# Patient Record
Sex: FEMALE | Race: BLACK OR AFRICAN AMERICAN | NOT HISPANIC OR LATINO | Employment: FULL TIME | URBAN - METROPOLITAN AREA
[De-identification: names, ages, dates, MRNs, and addresses within clinical notes are randomized per-mention and may not be internally consistent; named-entity substitution may affect disease eponyms.]

---

## 2020-03-03 ENCOUNTER — HOSPITAL ENCOUNTER (EMERGENCY)
Facility: CLINIC | Age: 25
Discharge: HOME | End: 2020-03-04
Attending: EMERGENCY MEDICINE | Admitting: EMERGENCY MEDICINE
Payer: COMMERCIAL

## 2020-03-03 DIAGNOSIS — T50.905A ADVERSE EFFECT OF DRUG, INITIAL ENCOUNTER: ICD-10-CM

## 2020-03-03 DIAGNOSIS — R40.0 SOMNOLENCE: ICD-10-CM

## 2020-03-03 PROCEDURE — 96360 HYDRATION IV INFUSION INIT: CPT

## 2020-03-03 PROCEDURE — 25800030 ZZH RX IP 258 OP 636: Performed by: EMERGENCY MEDICINE

## 2020-03-03 PROCEDURE — 99291 CRITICAL CARE FIRST HOUR: CPT | Mod: 25

## 2020-03-03 PROCEDURE — 40000275 ZZH STATISTIC RCP TIME EA 10 MIN

## 2020-03-03 PROCEDURE — 40000965 ZZH STATISTIC END TITIAL CO2 MONITORING

## 2020-03-03 PROCEDURE — 80053 COMPREHEN METABOLIC PANEL: CPT | Performed by: EMERGENCY MEDICINE

## 2020-03-03 PROCEDURE — 85025 COMPLETE CBC W/AUTO DIFF WBC: CPT | Performed by: EMERGENCY MEDICINE

## 2020-03-03 PROCEDURE — 93005 ELECTROCARDIOGRAM TRACING: CPT

## 2020-03-03 PROCEDURE — 96361 HYDRATE IV INFUSION ADD-ON: CPT

## 2020-03-03 RX ORDER — SODIUM CHLORIDE 9 MG/ML
1000 INJECTION, SOLUTION INTRAVENOUS CONTINUOUS
Status: DISCONTINUED | OUTPATIENT
Start: 2020-03-04 | End: 2020-03-04 | Stop reason: HOSPADM

## 2020-03-03 RX ADMIN — SODIUM CHLORIDE 1000 ML: 9 INJECTION, SOLUTION INTRAVENOUS at 23:56

## 2020-03-03 NOTE — ED AVS SNAPSHOT
Emergency Department  [address] [place] [address]  Phone:  [phone]  Fax:  [phone]                                    Thiago Mayorga   MRN: [id number]    Department:   Emergency Department   Date of Visit:  3/3/2020           After Visit Summary Signature Page    I have received my discharge instructions, and my questions have been answered. I have discussed any challenges I see with this plan with the nurse or doctor.    ..........................................................................................................................................  Patient/Patient Representative Signature      ..........................................................................................................................................  Patient Representative Print Name and Relationship to Patient    ..................................................               ................................................  Date                                   Time    ..........................................................................................................................................  Reviewed by Signature/Title    ...................................................              ..............................................  Date                                               Time          22EPIC Rev 08/18

## 2020-03-03 NOTE — LETTER
March 4, 2020      To Whom It May Concern:      Thiago Mayorga was seen in our Emergency Department today, 03/04/20.  I expect her condition to improve over the next 2 days.  She may return to work/school when improved.    Sincerely,        Xiao Farah RN

## 2020-03-04 ENCOUNTER — APPOINTMENT (OUTPATIENT)
Dept: CT IMAGING | Facility: CLINIC | Age: 25
End: 2020-03-04
Attending: EMERGENCY MEDICINE
Payer: COMMERCIAL

## 2020-03-04 VITALS
HEIGHT: 64 IN | WEIGHT: 99.21 LBS | BODY MASS INDEX: 16.94 KG/M2 | HEART RATE: 96 BPM | DIASTOLIC BLOOD PRESSURE: 70 MMHG | SYSTOLIC BLOOD PRESSURE: 109 MMHG | RESPIRATION RATE: 20 BRPM | OXYGEN SATURATION: 98 %

## 2020-03-04 LAB
ALBUMIN SERPL-MCNC: 3.4 G/DL (ref 3.4–5)
ALP SERPL-CCNC: 52 U/L (ref 40–150)
ALT SERPL W P-5'-P-CCNC: 15 U/L (ref 0–50)
ANION GAP SERPL CALCULATED.3IONS-SCNC: 7 MMOL/L (ref 3–14)
AST SERPL W P-5'-P-CCNC: 18 U/L (ref 0–45)
B-HCG FREE SERPL-ACNC: <5 IU/L
BASOPHILS # BLD AUTO: 0 10E9/L (ref 0–0.2)
BASOPHILS NFR BLD AUTO: 0.3 %
BILIRUB SERPL-MCNC: 0.2 MG/DL (ref 0.2–1.3)
BUN SERPL-MCNC: 9 MG/DL (ref 7–30)
CALCIUM SERPL-MCNC: 7.5 MG/DL (ref 8.5–10.1)
CHLORIDE SERPL-SCNC: 110 MMOL/L (ref 94–109)
CO2 SERPL-SCNC: 21 MMOL/L (ref 20–32)
CREAT SERPL-MCNC: 0.46 MG/DL (ref 0.52–1.04)
DIFFERENTIAL METHOD BLD: ABNORMAL
EOSINOPHIL # BLD AUTO: 0.1 10E9/L (ref 0–0.7)
EOSINOPHIL NFR BLD AUTO: 1.4 %
ERYTHROCYTE [DISTWIDTH] IN BLOOD BY AUTOMATED COUNT: 17.7 % (ref 10–15)
GFR SERPL CREATININE-BSD FRML MDRD: >90 ML/MIN/{1.73_M2}
GLUCOSE SERPL-MCNC: 101 MG/DL (ref 70–99)
HCT VFR BLD AUTO: 32.4 % (ref 35–47)
HGB BLD-MCNC: 9.8 G/DL (ref 11.7–15.7)
IMM GRANULOCYTES # BLD: 0 10E9/L (ref 0–0.4)
IMM GRANULOCYTES NFR BLD: 0.2 %
LYMPHOCYTES # BLD AUTO: 1.7 10E9/L (ref 0.8–5.3)
LYMPHOCYTES NFR BLD AUTO: 27 %
MCH RBC QN AUTO: 21.1 PG (ref 26.5–33)
MCHC RBC AUTO-ENTMCNC: 30.2 G/DL (ref 31.5–36.5)
MCV RBC AUTO: 70 FL (ref 78–100)
MONOCYTES # BLD AUTO: 0.5 10E9/L (ref 0–1.3)
MONOCYTES NFR BLD AUTO: 7.1 %
NEUTROPHILS # BLD AUTO: 4.1 10E9/L (ref 1.6–8.3)
NEUTROPHILS NFR BLD AUTO: 64 %
NRBC # BLD AUTO: 0 10*3/UL
NRBC BLD AUTO-RTO: 0 /100
PLATELET # BLD AUTO: 157 10E9/L (ref 150–450)
POTASSIUM SERPL-SCNC: 3.2 MMOL/L (ref 3.4–5.3)
PROT SERPL-MCNC: 6.7 G/DL (ref 6.8–8.8)
RBC # BLD AUTO: 4.65 10E12/L (ref 3.8–5.2)
SODIUM SERPL-SCNC: 138 MMOL/L (ref 133–144)
WBC # BLD AUTO: 6.4 10E9/L (ref 4–11)

## 2020-03-04 PROCEDURE — 70450 CT HEAD/BRAIN W/O DYE: CPT

## 2020-03-04 PROCEDURE — 84702 CHORIONIC GONADOTROPIN TEST: CPT

## 2020-03-04 ASSESSMENT — ENCOUNTER SYMPTOMS
FATIGUE: 1
NAUSEA: 1

## 2020-03-04 ASSESSMENT — MIFFLIN-ST. JEOR: SCORE: 1190

## 2020-03-04 NOTE — ED TRIAGE NOTES
Patient arrived via EMS after family found her unresponsive on the couch with slow deep respirations. EMS arrived to find patient with stable vital signs with the exception of being tachycardic, BG 86, patient appeared very somnolent but able to answer simple questions. EMS gave 300 mL NS en route. On arrival in the ED patient is alert but says she is very tired, vitally stable, slightly tachycardic with a flat affect.

## 2020-03-04 NOTE — ED NOTES
Bed: ST03  Expected date:   Expected time:   Means of arrival:   Comments:  441  24F Unresponsive/Unknown reason  [id number]

## 2020-03-04 NOTE — DISCHARGE INSTRUCTIONS
I recommend stopping BOTH the venlafaxine and the meclizine, as I am concerned one of these two medications caused you to be very sleepy today.  Have someone stay with you for the next 24 hours and return immediately if you have any return of your symptoms or ANY other emergent concerns.

## 2020-03-04 NOTE — ED PROVIDER NOTES
"  History     Chief Complaint:  Altered Mental Status    HPI   Thiago Mayorga is a 24 year old female who presents with an altered mental status. The patient was seen yesterday at her primary care provider for headaches. She was started on venlafaxine for this, as well as meclizine for dizziness. She took these medications once last night and again this morning. Today, the patient's cousin noted the patient was breathing fast and was unresponsive, prompting her call to EMS. Upon their arrival at the scene, EMS reports deep and slow breathing as well as equal and reactive pupils. Her blood sugar was 86 and her other vitals were normal. The patient became slightly more responsive after  0.5 of narcan, though there was no change in her mental status with a second dose of this. Here, she notes being tired and nauseous throughout the day today.     Laboratory results 3/2/20:  CBC: WBC 7.7, HGB 10.2 (L),    BMP: chloride 110 (H), creatinine 0.48 (L), glucose 109 (H) o/w WNL   TSH with free T4: 0.29 (L)    Allergies:  NKDA     Medications:    The patient is currently on no regular medications.      Past Medical History:    migraine    Past Surgical History:    The patient does not have any pertinent past surgical history  Family History:    No past pertinent family history.     Social History:  The patient was accompanied to the ED by her cousin.  Tobacco use: negative   Alcohol use: negative   PCP: No primary care provider on file.     Review of Systems   Constitutional: Positive for fatigue.   Gastrointestinal: Positive for nausea.   All other systems reviewed and are negative.      Physical Exam     Patient Vitals for the past 24 hrs:   BP Pulse Heart Rate Resp SpO2 Height Weight   03/04/20 0130 111/77 92 93 20 100 % -- --   03/04/20 0115 106/70 103 -- -- 100 % -- --   03/04/20 0030 -- -- 98 20 100 % -- --   03/04/20 0015 123/84 -- 102 21 100 % -- --   03/04/20 0006 -- -- -- -- -- 1.626 m (5' 4\") 45 kg " (99 lb 3.3 oz)   03/04/20 0000 -- -- 106 23 100 % -- --   03/03/20 2340 -- -- -- -- 100 % -- --        Physical Exam  General: healthy young woman, lying supine. Appearing somewhat tired but otherwise interacting appropriately. In no distress    Eye:  Pupils are equal, round, and reactive.  Extraocular movements intact.    ENT:  No rhinorrhea.  Moist mucus membranes.  Normal tongue and tonsil.    Cardiac:  Regular rate and rhythm.  No murmurs, gallops, or rubs.    Pulmonary:  Clear to auscultation bilaterally.  No wheezes, rales, or rhonchi.    Abdomen:  Positive bowel sounds.  Abdomen is soft and non-distended, without focal tenderness.    Musculoskeletal:  Normal movement of all extremities without evidence for deficit.    Skin:  Warm and dry without rashes.    Neurologic:  CN II - XII intact.  5/5 strength in all extremities.  Normal sensation throughout.  Normal finger to nose and heel to shin.  2+ patellar reflexes.  Normal gait.    Psychiatric:  Normal affect with appropriate interaction with examiner.     Emergency Department Course     Imaging:  Radiology findings were communicated with the patient who voiced understanding of the findings.    CT head w/o IV contrast:   Normal head CT, as per radiology.     Laboratory:  Laboratory findings were communicated with the patient who voiced understanding of the findings.    CBC: WBC 6.4, HGB 9.8 (L),    CMP: potassium 3.2 (L), chloride 110 (H), glucose 101 (H), creatinine 0.46 (L), calcium 7.5 (L), protein total 6.7 (L) o/w WNL     ISTAT HCG quantitative pregnancy POCT: <5.0    Interventions:  2356 NS 1 L IV     Emergency Department Course:  Past medical records, nursing notes, and vitals reviewed.    2334 Patient arrived in the Emergency Department. I performed an exam of the patient as documented above.     2337 IV was inserted and blood was drawn for laboratory testing, results above.    The patient was sent for a head CT while in the emergency  department, results above.     0151 Patient rechecked and updated.      I personally reviewed the laboratory and imaging results with the Patient and cousin and answered all related questions prior to discharge.      Impression & Plan     Medical Decision Making:  Thiago Mayorga is a 24 year old female who presents to the emergency department today with transient alteration of mental state.  She was seen yesterday by her family practice physician and was initiated on both venlafaxine and meclizine for headaches and for dizziness.  She took doses of both last night and then again took her venlafaxine this morning.  She describes feeling very tired throughout the day and her family member became very worried tonight with the patient had fallen asleep on the couch and seemed to be breathing erratically.  She was difficult to arouse.  EMS noted the same, that she was very tired and would not fully come to.  A dose of Narcan was given with possibly some improvement though a second dose did not bring her out of her stupor.  As they arrived to the emergency department, she seemed to be improving and is awake at the time of my assessment.    On exam, she has completely normal vital signs.  Her head to toe exam is reassuring including a very thorough neurologic exam.  She voices to me that she feels well, though she does continue to seem to be slightly sleepy from her baseline according to her cousin.  With this strange presentation, a laboratory investigation was pursued which is all unremarkable.  Head CT was obtained which is also negative.  On my final reassessment, she is very alert and interactive and at baseline according to family.  The exact cause of her symptoms is unclear, though it is difficult to ignore the recent initiation of 2 new medications with the symptoms.  I advised that she stop both the meclizine and venlafaxine immediately.  I asked to have somebody stay with her tonight.  I urged her to  follow-up with her doctor later in the week.  We did discuss admission to the hospital, though she continues to have normal vital signs and is back to her baseline.  With no other serious findings determined based on her work-up today, I feel she is safe for discharge home.  She knows to return to us immediately for any worsening of condition or other emergent concerns.      Discharge Diagnosis:    ICD-10-CM    1. Somnolence R40.0     RESOLVED   2. Adverse effect of drug, initial encounter T50.905A     PROBABLE     Disposition:  Discharged to home     Scribe Disclosure:  I, Natalie Diaz, am serving as a scribe at 11:45 PM on 3/3/2020 to document services personally performed by Trierweiler, Chad A, MD based on my observations and the provider's statements to me.       Trierweiler, Chad A, MD  03/04/20 0742

## 2020-07-24 ENCOUNTER — HOSPITAL ENCOUNTER (EMERGENCY)
Facility: CLINIC | Age: 25
Discharge: HOME | End: 2020-07-24
Attending: EMERGENCY MEDICINE | Admitting: EMERGENCY MEDICINE
Payer: COMMERCIAL

## 2020-07-24 VITALS
TEMPERATURE: 98.3 F | HEART RATE: 80 BPM | DIASTOLIC BLOOD PRESSURE: 80 MMHG | SYSTOLIC BLOOD PRESSURE: 102 MMHG | OXYGEN SATURATION: 99 % | RESPIRATION RATE: 18 BRPM

## 2020-07-24 DIAGNOSIS — G43.B0 OPHTHALMOPLEGIC MIGRAINE, NOT INTRACTABLE: ICD-10-CM

## 2020-07-24 PROCEDURE — 96374 THER/PROPH/DIAG INJ IV PUSH: CPT

## 2020-07-24 PROCEDURE — 99284 EMERGENCY DEPT VISIT MOD MDM: CPT | Mod: 25

## 2020-07-24 PROCEDURE — 96361 HYDRATE IV INFUSION ADD-ON: CPT

## 2020-07-24 PROCEDURE — 25000128 H RX IP 250 OP 636: Performed by: EMERGENCY MEDICINE

## 2020-07-24 PROCEDURE — 25800030 ZZH RX IP 258 OP 636: Performed by: EMERGENCY MEDICINE

## 2020-07-24 PROCEDURE — 25000132 ZZH RX MED GY IP 250 OP 250 PS 637: Performed by: EMERGENCY MEDICINE

## 2020-07-24 RX ORDER — ACETAMINOPHEN 500 MG
1000 TABLET ORAL ONCE
Status: COMPLETED | OUTPATIENT
Start: 2020-07-24 | End: 2020-07-24

## 2020-07-24 RX ORDER — KETOROLAC TROMETHAMINE 15 MG/ML
15 INJECTION, SOLUTION INTRAMUSCULAR; INTRAVENOUS ONCE
Status: COMPLETED | OUTPATIENT
Start: 2020-07-24 | End: 2020-07-24

## 2020-07-24 RX ADMIN — ACETAMINOPHEN 1000 MG: 500 TABLET, FILM COATED ORAL at 16:46

## 2020-07-24 RX ADMIN — KETOROLAC TROMETHAMINE 15 MG: 15 INJECTION, SOLUTION INTRAMUSCULAR; INTRAVENOUS at 15:56

## 2020-07-24 RX ADMIN — SODIUM CHLORIDE 1000 ML: 9 INJECTION, SOLUTION INTRAVENOUS at 15:54

## 2020-07-24 ASSESSMENT — ENCOUNTER SYMPTOMS
HEADACHES: 1
VOMITING: 0
CONSTIPATION: 0
COUGH: 0
DYSURIA: 0
FREQUENCY: 0
HEMATURIA: 0
FEVER: 0
SORE THROAT: 0
DIARRHEA: 0

## 2020-07-24 NOTE — ED PROVIDER NOTES
History   Chief Complaint:  Headache     HPI   Thiago Mayorga is a 24 year old female with history of ophthalmic   migraines who presents for evaluation of right-sided headache headache, associated with loss of vision in her right eye, that began yesterday. The patient states she had a history of headaches and she typically has headaches once per week, lasting about 2 days and alleviates with Tylenol and Advil.  She has not taken either with this headache.  She notes her headaches can be either right-sided or left-sided.  No clear precipitating factors.  She is seen neurology for these headaches.  Yesterday she had onset of a right-sided headache with associated loss of vision. Due to her symptoms not subsiding, she presented for evaluation.    Here, the patient states she has not used any medications to alleviate her headache and she believes her symptoms are similar to her typical migraines. She denies any fever, cough, sore throat, loss of sense of taste, loss of sense of smell, emesis, constipation, diarrhea, urinary symptoms, or other COVID-19 symptoms.     Allergies:  No Known Drug Allergies     Medications:  The patient is not currently taking any prescribed medications.     Past Medical History:    Migraine  Iron deficiency anemia      Past Surgical History:    History reviewed. No pertinent past surgical history.     Family History:    The patient denies any relevant family medical history.     Social History:  The patient was accompanied to the ED by friend.  Smoking Status: Never Smoker  Smokeless Tobacco: Never Used  Alcohol Use: Yes  Drug Use: No    Review of Systems   Constitutional: Negative for fever.        - Loss of sense of taste  - Loss of sense of smell   HENT: Negative for sore throat.    Eyes: Positive for visual disturbance.   Respiratory: Negative for cough.    Gastrointestinal: Negative for constipation, diarrhea and vomiting.   Genitourinary: Negative for dysuria, frequency,  hematuria and urgency.   Neurological: Positive for headaches.   All other systems reviewed and are negative.    Physical Exam     Patient Vitals for the past 24 hrs:   BP Temp Temp src Pulse Heart Rate Resp SpO2   07/24/20 1448 110/68 98.3  F (36.8  C) Oral 76 76 16 100 %     Physical Exam    Physical Exam   Constitutional:  Patient is oriented to person, place, and time. They appear well-developed and well-nourished. Mild distress secondary to headache   HENT:   Mouth/Throat:   Oropharynx is clear and moist.   Eyes:    Conjunctivae normal and EOM are normal. Pupils are equal, round, and reactive to light.   Neck:    Normal range of motion.   Cardiovascular: Normal rate, regular rhythm and normal heart sounds.  Exam reveals no gallop and no friction rub.  No murmur heard.  Pulmonary/Chest:  Effort normal and breath sounds normal. Patient has no wheezes. Patient has no rales.   Abdominal:   Soft. Bowel sounds are normal. Patient exhibits no mass. There is no tenderness. There is no rebound and no guarding.   Musculoskeletal:  Normal range of motion. Patient exhibits no edema.   Neurological:   Patient is alert and oriented to person, place, and time. Patient has normal strength. No cranial nerve deficit or sensory deficit. GCS 15  Skin:   Skin is warm and dry. No rash noted. No erythema.   Psychiatric:   Patient has a normal mood and affect. Patient's behavior is normal. Judgment and thought content normal.      Emergency Department Course     Interventions:  1554 0.9% NaCl bolus 1000 mL IV   1556 Toradol 15 mg IV    Emergency Department Course:  Past medical records, nursing notes, and vitals reviewed.    (8483)   I performed an exam of the patient as documented above. History obtained from patient.     (9911)   I rechecked the patient and discussed results and plan of care.     Findings and plan explained to the Patient. Patient discharged home with instructions regarding supportive care, medications, and reasons  to return. The importance of close follow-up was reviewed. I personally answered all related questions prior to discharge.     Impression & Plan     Medical Decision Making:  The patient's presentation here today is consistent with her normal migraine.  Evaluation in the emergency department has been negative. The patient has not had any fever, weakness, numbness, paresthesia, neck stiffness or confusion. Meningitis, subarachnoid hemorrhage, CNS tumor, and stroke are considered as part of the differential, and considered unlikely. The patient's symptoms greatly improved with medication interventions as seen above.  The patient should follow-up with her primary physician within 3 days. If the headache continues or the frequency increases, consultation with neurology will be indicated.  Return if increasing pain, fever, vomiting, neck stiffness, syncope, or weakness.  Take prescribed medications as directed. All questions were answered prior to the patient's discharge. She was in agreement with the plan stated above.      Diagnosis:    ICD-10-CM    1. Ophthalmoplegic migraine, not intractable  G43.B0      Disposition:  Discharged to home.     Scribe Disclosure:  I, Mark Mari, am serving as a scribe at 3:24 PM on 7/24/2020 to document services personally performed by Leonor Gibbs MD based on my observations and the provider's statements to me.  July 24, 2020   [facility] EMERGENCY DEPARTMENT       Leonor Gibbs MD  07/24/20 8109

## 2020-07-24 NOTE — ED AVS SNAPSHOT
Emergency Department  [address] [place] [id number]  Phone:  [phone]  Fax:  [phone]                                    Thiago Mayorga   MRN: [id number]    Department:   Emergency Department   Date of Visit:  7/24/2020           After Visit Summary Signature Page    I have received my discharge instructions, and my questions have been answered. I have discussed any challenges I see with this plan with the nurse or doctor.    ..........................................................................................................................................  Patient/Patient Representative Signature      ..........................................................................................................................................  Patient Representative Print Name and Relationship to Patient    ..................................................               ................................................  Date                                   Time    ..........................................................................................................................................  Reviewed by Signature/Title    ...................................................              ..............................................  Date                                               Time          22EPIC Rev 08/18

## 2021-01-04 ENCOUNTER — HEALTH MAINTENANCE LETTER (OUTPATIENT)
Age: 26
End: 2021-01-04

## 2021-10-10 ENCOUNTER — HEALTH MAINTENANCE LETTER (OUTPATIENT)
Age: 26
End: 2021-10-10

## 2022-01-06 ENCOUNTER — HOSPITAL ENCOUNTER (EMERGENCY)
Facility: CLINIC | Age: 27
Discharge: HOME | End: 2022-01-06
Attending: PHYSICIAN ASSISTANT | Admitting: PHYSICIAN ASSISTANT
Payer: COMMERCIAL

## 2022-01-06 VITALS
HEART RATE: 109 BPM | HEIGHT: 61 IN | BODY MASS INDEX: 18.74 KG/M2 | RESPIRATION RATE: 20 BRPM | DIASTOLIC BLOOD PRESSURE: 64 MMHG | SYSTOLIC BLOOD PRESSURE: 110 MMHG | TEMPERATURE: 99.8 F | OXYGEN SATURATION: 100 %

## 2022-01-06 DIAGNOSIS — R05.9 COUGH: ICD-10-CM

## 2022-01-06 DIAGNOSIS — Z20.822 ENCOUNTER FOR LABORATORY TESTING FOR COVID-19 VIRUS: ICD-10-CM

## 2022-01-06 LAB
FLUAV RNA SPEC QL NAA+PROBE: NEGATIVE
FLUBV RNA RESP QL NAA+PROBE: NEGATIVE
SARS-COV-2 RNA RESP QL NAA+PROBE: POSITIVE

## 2022-01-06 PROCEDURE — C9803 HOPD COVID-19 SPEC COLLECT: HCPCS

## 2022-01-06 PROCEDURE — 87636 SARSCOV2 & INF A&B AMP PRB: CPT | Performed by: PHYSICIAN ASSISTANT

## 2022-01-06 PROCEDURE — 99283 EMERGENCY DEPT VISIT LOW MDM: CPT

## 2022-01-06 ASSESSMENT — ENCOUNTER SYMPTOMS
CHILLS: 0
FEVER: 0
DIARRHEA: 0
HEADACHES: 1
SHORTNESS OF BREATH: 0
NAUSEA: 0
VOMITING: 0
ABDOMINAL PAIN: 0
COUGH: 1

## 2022-01-06 NOTE — ED PROVIDER NOTES
"  History   Chief Complaint:  Covid 19 Testing     The history is provided by the patient.      Thiago Mayorga is an otherwise healthy 26 year old female who presents for COVID testing. The patient reports onset of cough and headache yesterday.  She denies any ill contacts.  She is not vaccinated against COVID.  She denies any fevers, chills, chest pain, dyspnea, abdominal pain, nausea, vomiting, diarrhea.    Review of Systems   Constitutional: Negative for chills and fever.   Respiratory: Positive for cough. Negative for shortness of breath.    Cardiovascular: Negative for chest pain.   Gastrointestinal: Negative for abdominal pain, diarrhea, nausea and vomiting.   Neurological: Positive for headaches.   All other systems reviewed and are negative.      Allergies:  No Known Allergies    Medications:  The patient is not currently taking any daily medications.     Past Medical History:     Migraines  Anemia     Past Surgical History:    The patient denies past surgical history.      Family History:    The patient denies past family history.     Social History:  Presents to ED alone.     Physical Exam     Patient Vitals for the past 24 hrs:   BP Temp Temp src Pulse Resp SpO2 Height   01/06/22 1106 110/64 99.8  F (37.7  C) Temporal 109 20 100 % 1.549 m (5' 1\")       Physical Exam  Constitutional: Pleasant. Cooperative.   Eyes: Pupils equally round and reactive  HENT: Head is normal in appearance. Oropharynx is normal with moist mucus membranes.  Cardiovascular: Regular rate and rhythm and without murmurs.  Respiratory: Normal respiratory effort, lungs are clear bilaterally.  Musculoskeletal: No asymmetry of the lower extremities, no tenderness to palpation.   Skin: Normal, without rash.  Neurologic: Cranial nerves grossly intact, normal cognition, no focal deficits. Alert and oriented x 3.   Psychiatric: Normal affect.  Nursing notes and vital signs reviewed.    Emergency Department Course "     Laboratory:  COVID: Pending     Emergency Department Course:  Reviewed:  I reviewed nursing notes, vitals, past medical history, Care Everywhere and [facility].    Assessments:  1131 I obtained history and examined the patient as noted above.     Disposition:  The patient was discharged to home.     Impression & Plan     Medical Decision Making:  Thiago Mayorga is a 26 year old female who presents for evaluation of cough and headache since yesterday. COVID/Influenza swabs pending. Given that the patient is otherwise hemodynamically stable without significant hypoxia, I do not believe that the patient requires admission here today. Return to the ED for high fevers > 103 for more than 48 hours more, increasing productive cough, shortness of breath, or confusion.  There are no signs of serious bacterial infection such as bacteremia, meningitis, UTI/pyelonephritis, strep pharyngitis, etc. I discussed my findings and plan with the patient and they are amenable at this time.  All questions were answered and patient will be discharged home in stable condition.    Diagnosis:    ICD-10-CM    1. Cough  R05.9    2. Encounter for laboratory testing for COVID-19 virus  Z20.822      Scribe Disclosure:  I, Jacinta Munoz, am serving as a scribe at 11:24 AM on 1/6/2022 to document services personally performed by Tito Sarmiento PA-C based on my observations and the provider's statements to me.     This record was created at least in part using electronic voice recognition software, so please excuse any typographical errors.       Tito Sarmiento PA-C  01/06/22 180

## 2022-01-06 NOTE — DISCHARGE INSTRUCTIONS
Discharge Instructions  COVID-19    COVID-19 is the disease caused by a new coronavirus. The virus spreads from person-to-person primarily by droplets when an infected person coughs or sneezes and the droplets are then breathed in by another person. There are tests available to diagnose COVID-19. You may have been diagnosed with COVID, may be being tested for COVID and have a pending test result, or may have been exposed to COVID.    Symptoms of COVID-19  Many people have no symptoms or mild symptoms.  Symptoms may usually appear 4 to 5 days (up to 14 days) after contact with a person with COVID-19. Some people will get severe symptoms and pneumonia. Usual symptoms are:     ? Fever  ? Cough  ? Trouble breathing    Less common symptoms are: Headache, body aches, sore throat, sneezing, diarrhea, loss of taste or smell.    Isolation and Quarantine    You may have been seen because you have symptoms, had an exposure, or had some other concern about possible COVID. The best way to stop the spread of the virus is to avoid contact with others.    Isolation refers to sick people staying away from people who are not sick. A person in quarantine is limiting activity because they were exposed and are waiting to see if they might become sick.    If you test positive for COVID, you should stay home (isolation) for at least 10 days after your symptoms began, and for 24 hours with no fever and improvement of symptoms--whichever is longer. (Your fever should be gone for 24 hours without using fever-reducing medicine). If you have no symptoms, you should stay home (isolation) for 10 days from the day of the test. If you have been vaccinated for COVID, the vaccination will not cause you to test positive so a positive test result generally is a  true positive .    For example, if you have a fever and cough for 6 days, you need to stay home 4 more days with no fever for a total of 10 days. Or, if you have a fever and cough for 10 days,  you need to stay home one more day with no fever for a total of 11 days.    If you have a high-risk exposure to COVID (you spent 15 minutes or more within six feet of somebody who has COVID), you should stay home (quarantine) for 14 days, unless you are vaccinated. Even if you test negative for COVID, the CDC recommends a 14-day quarantine from the time of your last exposure to that individual (unless you are vaccinated). There are options for a shortened (<14 day quarantine) you can review at:  https://[email or web address]./diseases/coronavirus/close.html#long    If you live in the same house as somebody with COVID and cannot separate from them, you will need to quarantine for 14-days after that person's isolation (infectious) period. That means that you may need to quarantine for 24-days after that person became symptomatic/ill.    If you are vaccinated and do not develop symptoms, you do not need to quarantine after exposure.    If you have symptoms but a negative test, you should stay at home until you are symptom-free and without fever for 24 hours, using the same judgment you would for when it is safe to return to work/school from strep throat, influenza, or the common cold. If you worsen, you should consider being re-evaluated.    If you are being tested for COVID because of symptoms and your test is pending, you should stay home until you know your test result.    If I have COVID, how should I protect myself and others?    Do not go to work or school. Have a friend or relative do your shopping. Do not use public transportation (bus, train) or ridesharing (Lyft, Uber).    Separate yourself from other people in your home. As much as possible, you should stay in one room and away from other people in your home. Also, use a separate bathroom, if possible. Avoid handling pets or other animals while sick.     Wear a facemask if you need to be around other people and cover your mouth and nose with a tissue when  you cough or sneeze.     Avoid sharing personal household items. You should not share dishes, drinking glasses, forks/knives/spoons, towels, or bedding with other people in your home. After using these items, they should be washed with soap and water. Clean parts of your home that are touched often (doorknobs, faucets, countertops, etc.) daily.     Wash your hands often with soap and water for at least 20 seconds or use an alcohol-based hand  containing at least 60% alcohol.     Avoid touching your face.    Treat your symptoms. You can take Acetaminophen (Tylenol) to treat body aches and fever as needed for comfort. Ibuprofen (Advil or Motrin) can be used as well if you still have symptoms after taking Tylenol. Drink fluids. Rest.    Watch for worsening symptoms such as shortness of breath/difficulty breathing or very severe weakness.    Employers/workplaces are being asked by the Centers for Disease Control (CDC) to not request notes/documentation for you to return to work or prove that you were ill. You may choose to show your employer this paperwork. Also, repeat testing should not be required to return to work.    Exercise/Sports in rare cases, COVID could affect your heart in a way that makes exercise or participation in sports dangerous.    If you have a mild COVID illness (fever, cough, sore throat, and similar symptoms but no difficulty breathing or abnormalities of the lung): After your COVID symptoms have resolved, wait 14-days before returning to activity.  If you have more than a mild illness (meaning that you have problems with your breathing or lungs) or if you participate in competitive or strenuous activity or have a history of heart disease: Please see your primary doctor/provider prior to return to activity/competition.    Antibody treatments are available for patients with mild to moderate COVID illness in order to prevent severe illness. In general, only patients with risk factors for  severe illness are eligible for treatment. For more information, to see if you are eligible, and to find treatment, go to the [facility]:  [email or web address]/coronavirus/mnrap.html     Return to the Emergency Department if:    If you are developing worsening breathing, shortness of breath, or feel worse you should seek medical attention.  If you are uncertain, contact your health care provider/clinic. If you need emergency medical attention, call 911 and tell them you have been ill.

## 2022-01-30 ENCOUNTER — HEALTH MAINTENANCE LETTER (OUTPATIENT)
Age: 27
End: 2022-01-30

## 2022-08-24 ENCOUNTER — OFFICE VISIT (OUTPATIENT)
Dept: URGENT CARE | Facility: URGENT CARE | Age: 27
End: 2022-08-24
Payer: COMMERCIAL

## 2022-08-24 VITALS
RESPIRATION RATE: 16 BRPM | WEIGHT: 99.2 LBS | SYSTOLIC BLOOD PRESSURE: 97 MMHG | DIASTOLIC BLOOD PRESSURE: 64 MMHG | HEART RATE: 82 BPM | BODY MASS INDEX: 18.74 KG/M2 | OXYGEN SATURATION: 100 % | TEMPERATURE: 98.5 F

## 2022-08-24 DIAGNOSIS — S16.1XXA STRAIN OF NECK MUSCLE, INITIAL ENCOUNTER: Primary | ICD-10-CM

## 2022-08-24 PROCEDURE — 99203 OFFICE O/P NEW LOW 30 MIN: CPT | Performed by: PHYSICIAN ASSISTANT

## 2022-08-24 RX ORDER — CITALOPRAM HYDROBROMIDE 10 MG/1
TABLET ORAL
COMMUNITY
Start: 2022-08-18 | End: 2024-03-27

## 2022-08-24 RX ORDER — FERROUS SULFATE 325(65) MG
TABLET, DELAYED RELEASE (ENTERIC COATED) ORAL
COMMUNITY
Start: 2022-08-18 | End: 2024-03-27

## 2022-08-24 RX ORDER — LANOLIN ALCOHOL/MO/W.PET/CERES
CREAM (GRAM) TOPICAL
COMMUNITY
Start: 2022-05-14

## 2022-08-24 NOTE — PATIENT INSTRUCTIONS
Patient was educated on the natural course of neck strain. Conservative measures discussed including rest, heat, massage, neck stretches, and over-the-counter analgesics (Tylenol or Ibuprofen) as needed. See your primary care provider if symptoms worsen or do not improve in 7 days. Seek emergency care if you develop severe pain/swelling, inability to move extremity, skin paleness, or weakness.

## 2022-08-24 NOTE — LETTER
[facility] [address] [address]  [phone]        2022    REPORT OF WORK ABILITY    PATIENT DATA  Employee Name: Thiago Mayorga        : 1995   [id number]  Work related injury: YES  Today's date: 2022  Date of injury: 2022     PROVIDER EVALUATION: Please fill in as needed.  Please give copy to employee for employer.  1. Diagnosis: Neck strain  2. Treatment: Rest, ice, Ibuprofen, massage, stretching  3. Medication: Ibuprofen  NOTE: When ordering a medication, [place] Rules require Work Comp or WC on prescriptions.  4. Return to work date: May return today with the following: * Restricted bending-neck limit : 10 degrees.   5. Restrictions in place until 22.    RESTRICTIONS: Unlimited unless listed.  Restrictions apply to home and leisure also.  If work within restrictions is not available, the employee is totally disabled.    Medical Examiner: Linda Marie PA-C        Next appointment: 4 days if no improvement with family practive    CC: Employer, Managed Care Plan/Payor, Patient

## 2022-08-24 NOTE — PROGRESS NOTES
URGENT CARE VISIT:    SUBJECTIVE:   Chief Complaint   Patient presents with     Neck Pain     Pt was at work yesterday and was working with head down. Pt states today her neck is extremely stiff and she is also having a headache       Firehiwbrennan Jair Mayorga is a 26 year old female who presents with a chief complaint of bilateral neck pain.  Symptoms began 1 day(s) ago, are moderate and gradual onset  Started after working with head down all day.  Pain exacerbated by bending. Relieved by rest.  She treated it initially with no therapy. This is the first time this type of injury has occurred to this patient.     PMH:   Past Medical History:   Diagnosis Date     Migraines      Allergies: Patient has no known allergies.   Medications:   Current Outpatient Medications   Medication Sig Dispense Refill     citalopram (CELEXA) 10 MG tablet        ferrous sulfate (FE TABS) 325 (65 Fe) MG EC tablet        MAGNESIUM OXIDE 400 (240 Mg) MG tablet TAKE 1/2 TABLET AS NEEDED BY MOUTH EVERY DAY X 30 DAYS       Social History:   Social History     Tobacco Use     Smoking status: Never Smoker     Smokeless tobacco: Never Used   Substance Use Topics     Alcohol use: Not on file       ROS:  Review of systems negative except as stated above.    OBJECTIVE:  BP 97/64   Pulse 82   Temp 98.5  F (36.9  C) (Tympanic)   Resp 16   Wt 45 kg (99 lb 3.2 oz)   SpO2 100%   BMI 18.74 kg/m    GENERAL APPEARANCE: healthy, alert and no distress  MUSCULOSKELETAL: moderate paracervical TTP. FROM. Pain with flexion and extension.  EXTREMITIES: peripheral pulses normal  SKIN: no rashes  NEURO: sensation intact       ASSESSMENT:    ICD-10-CM    1. Strain of neck muscle, initial encounter  S16.1XXA        PLAN:  Patient Instructions   Patient was educated on the natural course of neck strain. Conservative measures discussed including rest, heat, massage, neck stretches, and over-the-counter analgesics (Tylenol or Ibuprofen) as needed. See your primary  care provider if symptoms worsen or do not improve in 7 days. Seek emergency care if you develop severe pain/swelling, inability to move extremity, skin paleness, or weakness.     Patient verbalized understanding and is agreeable to plan. The patient was discharged ambulatory and in stable condition.    Linda Marie PA-C on 8/24/2022 at 3:20 PM

## 2022-09-24 ENCOUNTER — HEALTH MAINTENANCE LETTER (OUTPATIENT)
Age: 27
End: 2022-09-24

## 2023-05-08 ENCOUNTER — HEALTH MAINTENANCE LETTER (OUTPATIENT)
Age: 28
End: 2023-05-08

## 2023-08-01 ENCOUNTER — OFFICE VISIT (OUTPATIENT)
Dept: URGENT CARE | Facility: URGENT CARE | Age: 28
End: 2023-08-01
Payer: COMMERCIAL

## 2023-08-01 VITALS
BODY MASS INDEX: 18.63 KG/M2 | DIASTOLIC BLOOD PRESSURE: 71 MMHG | WEIGHT: 98.6 LBS | HEART RATE: 76 BPM | TEMPERATURE: 98.4 F | OXYGEN SATURATION: 100 % | SYSTOLIC BLOOD PRESSURE: 106 MMHG

## 2023-08-01 DIAGNOSIS — S69.90XA THUMB INJURY, INITIAL ENCOUNTER: Primary | ICD-10-CM

## 2023-08-01 PROCEDURE — 99214 OFFICE O/P EST MOD 30 MIN: CPT | Performed by: FAMILY MEDICINE

## 2023-08-01 NOTE — PROGRESS NOTES
SUBJECTIVE:  Chief Complaint   Patient presents with    Urgent Care     Pt presents with swollen right thumb and it's painful onset Saturday.   .ident presents with a chief complaint of right finger  first.  The injury occurred 3 days ago.   The injury happened while car door.   How: trauma: immediate pain    Past Medical History:   Diagnosis Date    Migraines      No Known Allergies  Social History     Tobacco Use    Smoking status: Never    Smokeless tobacco: Never   Substance Use Topics    Alcohol use: Never       ROSINTEGUMENTARY/SKIN: NEGATIVE for open wound/bleeding and POSITIVE for bruising    EXAM: /71 (BP Location: Left arm, Patient Position: Sitting, Cuff Size: Adult Small)   Pulse 76   Temp 98.4  F (36.9  C) (Tympanic)   Wt 44.7 kg (98 lb 9.6 oz)   SpO2 100%   BMI 18.63 kg/m    Gen: healthy,alert,no distress  Extremity: rt thumb tenderness, redness base of nail.   There is not compromise to the distal circulation.  Pulses are +2 and CRT is brisk.  EXTREMITIES: peripheral pulses normal  NEURO: Normal strength and tone, sensory exam grossly normal, mentation intact and speech normal    X-RAY pt has not yet reported to xr      ICD-10-CM    1. Thumb injury, initial encounter  S69.90XA XR Finger Right G/E 2 Views          RICE

## 2024-02-02 ENCOUNTER — HOSPITAL ENCOUNTER (EMERGENCY)
Facility: CLINIC | Age: 29
Discharge: HOME | End: 2024-02-02
Attending: EMERGENCY MEDICINE | Admitting: EMERGENCY MEDICINE
Payer: COMMERCIAL

## 2024-02-02 ENCOUNTER — APPOINTMENT (OUTPATIENT)
Dept: GENERAL RADIOLOGY | Facility: CLINIC | Age: 29
End: 2024-02-02
Attending: EMERGENCY MEDICINE
Payer: COMMERCIAL

## 2024-02-02 VITALS
HEART RATE: 91 BPM | OXYGEN SATURATION: 100 % | DIASTOLIC BLOOD PRESSURE: 70 MMHG | TEMPERATURE: 98.2 F | BODY MASS INDEX: 14.99 KG/M2 | WEIGHT: 90 LBS | RESPIRATION RATE: 16 BRPM | HEIGHT: 65 IN | SYSTOLIC BLOOD PRESSURE: 108 MMHG

## 2024-02-02 DIAGNOSIS — R05.9 COUGH, UNSPECIFIED TYPE: ICD-10-CM

## 2024-02-02 DIAGNOSIS — H00.15 CHALAZION LEFT LOWER EYELID: ICD-10-CM

## 2024-02-02 PROCEDURE — 99283 EMERGENCY DEPT VISIT LOW MDM: CPT | Mod: 25

## 2024-02-02 PROCEDURE — 71046 X-RAY EXAM CHEST 2 VIEWS: CPT

## 2024-02-02 ASSESSMENT — ACTIVITIES OF DAILY LIVING (ADL): ADLS_ACUITY_SCORE: 35

## 2024-02-02 ASSESSMENT — VISUAL ACUITY
OD: 20/25
OS: 20/30

## 2024-02-02 NOTE — ED TRIAGE NOTES
Left eye stye getting worse since Tuesday, no change in vision.     Triage Assessment (Adult)       Row Name 02/02/24 1157          Triage Assessment    Airway WDL WDL        Respiratory WDL    Respiratory WDL WDL        Skin Circulation/Temperature WDL    Skin Circulation/Temperature WDL X  Left eye stye        Cardiac WDL    Cardiac WDL WDL        Peripheral/Neurovascular WDL    Peripheral Neurovascular WDL WDL        Cognitive/Neuro/Behavioral WDL    Cognitive/Neuro/Behavioral WDL WDL

## 2024-02-02 NOTE — DISCHARGE INSTRUCTIONS
Warm compresses to the eye for 5 or 10 minutes 3 times a day.  No eye make-up.  Try your best not to itch the eye as that will make it more swollen

## 2024-02-02 NOTE — ED PROVIDER NOTES
"  History     Chief Complaint:  Eye Problem       HPI   Annawbrennan Mayorga is a 28 year old female who presents to the ED for a left lower eyelid chalazion that has been getting worse since Tuesday. She states it is painful and itchy. This has happened before 6 months ago in the same spot and it went away naturally without warm compress. She has not used warm compress for this stye. Denies change in vision. Denies using contacts or glasses. She has not seen an ophthalmologist yet.     She has been having a dry cough for the past 2 weeks. No fever or sore throat. She would like a chest x-ray.     Independent Historian:   None - Patient Only    Review of External Notes:   None     Medications:    Celexa     Past Medical History:    Migraines   Anemia     Past Surgical History:    History reviewed. No pertinent surgical history.     Physical Exam   Patient Vitals for the past 24 hrs:   BP Temp Temp src Pulse Resp SpO2 Height Weight   02/02/24 1155 108/70 98.2  F (36.8  C) Temporal 91 16 100 % 1.646 m (5' 4.8\") 40.8 kg (90 lb)        Physical Exam  Constitutional:  Patient is oriented to person, place, and time. They appear well-developed and well-nourished. Mild distress secondary to eye problem.   HENT:   Mouth/Throat:   Oropharynx is clear and moist.   Eyes:    Conjunctivae normal and EOM are normal. Pupils are equal, round, and reactive to light. Patient's left lower lid has a chalazion with no evidence of periorbital cellulitis.  Respiratory:  Normal breath sounds without wheezes rales or rhonchi  Neck:    Normal peripheral range of motion.   Musculoskeletal:  Normal range of motion. Patient exhibits no edema.   Neurological:   Patient is alert and oriented to person, place, and time. Patient has normal strength. No cranial nerve deficit or sensory deficit. GCS 15.  Skin:   Skin is warm and dry. No rash noted. No erythema.   Psychiatric:   Patient has a normal mood and affect. Patient's behavior is normal. " Judgment and thought content normal.     Emergency Department Course     Imaging:  XR Chest 2 Views    (Results Pending)     Narrative & Impression   CHEST TWO VIEWS  2/2/2024 1:23 PM      HISTORY: Cough for 2 weeks.     COMPARISON: None.                                                                       IMPRESSION: No focal consolidation, pleural effusion or pneumothorax.  Cardiomediastinal silhouette is unremarkable.   This result has not been signed. Information might be incomplete     Report per radiology    Laboratory:  Labs Ordered and Resulted from Time of ED Arrival to Time of ED Departure - No data to display     Emergency Department Course & Assessments:    Interventions:  Medications - No data to display     Assessments:  1252 I obtained the history and examined the patient as detailed above.   1315 I reassessed the patient. Patient would like a chest x-ray for her dry cough.     Independent Interpretation (X-rays, CTs, rhythm strip):  None     Consultations/Discussion of Management or Tests:  None     Social Determinants of Health affecting care:   None    Disposition:  The patient was discharged.     Impression & Plan      Medical Decision Making:  Thiago Mayorga is a 28 year old female who presents for evaluation of redness in the left lower eyelid.  This is consistent with a hordeolum.  There was not superimposed cellulitis.  No signs this has progressed to a more serious orbital cellulitis. There is no proptosis, vision changes, conjunctival changes.  Doubt other serious pathologies at this point. Medications for d/c noted above. See eye for recheck in 3-4 days.    Of note she also states she has had a dry cough for the past 2 weeks without any fever.  A chest x-ray was performed and there are no evidence of pneumonia.  Furthermore her respiratory exam did not show any wheezes or signs of bronchitis.  Vital signs are noted to be normal.  This is likely viral in etiology    Diagnosis:     ICD-10-CM    1. Chalazion left lower eyelid  H00.15       2. Cough, unspecified type  R05.9            Discharge Medications:  New Prescriptions    No medications on file     Scribe Disclosure:  I, Ranulfo Elliott, am serving as a scribe at 12:47 PM on 2/2/2024 to document services personally performed by Leonor Gibbs MD based on my observations and the provider's statements to me.   2/2/2024   Leonor Gibbs MD Bochert, Michelle Ann, MD  02/02/24 6872

## 2024-03-27 ENCOUNTER — OFFICE VISIT (OUTPATIENT)
Dept: FAMILY MEDICINE | Facility: CLINIC | Age: 29
End: 2024-03-27
Payer: COMMERCIAL

## 2024-03-27 VITALS
RESPIRATION RATE: 15 BRPM | HEART RATE: 79 BPM | BODY MASS INDEX: 16.39 KG/M2 | HEIGHT: 65 IN | TEMPERATURE: 97.3 F | DIASTOLIC BLOOD PRESSURE: 73 MMHG | OXYGEN SATURATION: 100 % | WEIGHT: 98.4 LBS | SYSTOLIC BLOOD PRESSURE: 108 MMHG

## 2024-03-27 DIAGNOSIS — Z11.4 SCREENING FOR HIV (HUMAN IMMUNODEFICIENCY VIRUS): ICD-10-CM

## 2024-03-27 DIAGNOSIS — Z00.00 ANNUAL PHYSICAL EXAM: Primary | ICD-10-CM

## 2024-03-27 DIAGNOSIS — Z11.59 NEED FOR HEPATITIS C SCREENING TEST: ICD-10-CM

## 2024-03-27 DIAGNOSIS — N92.6 IRREGULAR MENSES: ICD-10-CM

## 2024-03-27 DIAGNOSIS — D50.9 IRON DEFICIENCY ANEMIA, UNSPECIFIED IRON DEFICIENCY ANEMIA TYPE: ICD-10-CM

## 2024-03-27 DIAGNOSIS — Z11.3 ROUTINE SCREENING FOR STI (SEXUALLY TRANSMITTED INFECTION): ICD-10-CM

## 2024-03-27 DIAGNOSIS — E55.9 VITAMIN D DEFICIENCY: ICD-10-CM

## 2024-03-27 LAB
ALBUMIN SERPL BCG-MCNC: 4.4 G/DL (ref 3.5–5.2)
ALP SERPL-CCNC: 63 U/L (ref 40–150)
ALT SERPL W P-5'-P-CCNC: 8 U/L (ref 0–50)
ANION GAP SERPL CALCULATED.3IONS-SCNC: 9 MMOL/L (ref 7–15)
AST SERPL W P-5'-P-CCNC: 19 U/L (ref 0–45)
BILIRUB SERPL-MCNC: 0.2 MG/DL
BUN SERPL-MCNC: 10.9 MG/DL (ref 6–20)
CALCIUM SERPL-MCNC: 8.7 MG/DL (ref 8.6–10)
CHLORIDE SERPL-SCNC: 109 MMOL/L (ref 98–107)
CHOLEST SERPL-MCNC: 148 MG/DL
CREAT SERPL-MCNC: 0.57 MG/DL (ref 0.51–0.95)
DEPRECATED HCO3 PLAS-SCNC: 23 MMOL/L (ref 22–29)
EGFRCR SERPLBLD CKD-EPI 2021: >90 ML/MIN/1.73M2
ERYTHROCYTE [DISTWIDTH] IN BLOOD BY AUTOMATED COUNT: 22.8 % (ref 10–15)
FASTING STATUS PATIENT QL REPORTED: YES
FERRITIN SERPL-MCNC: 4 NG/ML (ref 6–175)
GLUCOSE SERPL-MCNC: 89 MG/DL (ref 70–99)
HCT VFR BLD AUTO: 28.9 % (ref 35–47)
HCV AB SERPL QL IA: NONREACTIVE
HDLC SERPL-MCNC: 53 MG/DL
HGB BLD-MCNC: 7.5 G/DL (ref 11.7–15.7)
LDLC SERPL CALC-MCNC: 85 MG/DL
MCH RBC QN AUTO: 16.9 PG (ref 26.5–33)
MCHC RBC AUTO-ENTMCNC: 26 G/DL (ref 31.5–36.5)
MCV RBC AUTO: 65 FL (ref 78–100)
NONHDLC SERPL-MCNC: 95 MG/DL
PLATELET # BLD AUTO: 342 10E3/UL (ref 150–450)
POTASSIUM SERPL-SCNC: 3.7 MMOL/L (ref 3.4–5.3)
PROT SERPL-MCNC: 7.3 G/DL (ref 6.4–8.3)
RBC # BLD AUTO: 4.43 10E6/UL (ref 3.8–5.2)
SODIUM SERPL-SCNC: 141 MMOL/L (ref 135–145)
T PALLIDUM AB SER QL: NONREACTIVE
T VAGINALIS DNA SPEC QL NAA+PROBE: NOT DETECTED
TRIGL SERPL-MCNC: 52 MG/DL
TSH SERPL DL<=0.005 MIU/L-ACNC: 2.32 UIU/ML (ref 0.3–4.2)
VIT D+METAB SERPL-MCNC: 12 NG/ML (ref 20–50)
WBC # BLD AUTO: 4.4 10E3/UL (ref 4–11)

## 2024-03-27 PROCEDURE — 86803 HEPATITIS C AB TEST: CPT

## 2024-03-27 PROCEDURE — 80053 COMPREHEN METABOLIC PANEL: CPT

## 2024-03-27 PROCEDURE — 85027 COMPLETE CBC AUTOMATED: CPT

## 2024-03-27 PROCEDURE — 87591 N.GONORRHOEAE DNA AMP PROB: CPT

## 2024-03-27 PROCEDURE — 86780 TREPONEMA PALLIDUM: CPT

## 2024-03-27 PROCEDURE — 99214 OFFICE O/P EST MOD 30 MIN: CPT | Mod: 25

## 2024-03-27 PROCEDURE — 84443 ASSAY THYROID STIM HORMONE: CPT

## 2024-03-27 PROCEDURE — 82728 ASSAY OF FERRITIN: CPT

## 2024-03-27 PROCEDURE — 87661 TRICHOMONAS VAGINALIS AMPLIF: CPT

## 2024-03-27 PROCEDURE — 80061 LIPID PANEL: CPT

## 2024-03-27 PROCEDURE — 87389 HIV-1 AG W/HIV-1&-2 AB AG IA: CPT

## 2024-03-27 PROCEDURE — 82306 VITAMIN D 25 HYDROXY: CPT

## 2024-03-27 PROCEDURE — 99395 PREV VISIT EST AGE 18-39: CPT

## 2024-03-27 PROCEDURE — 87491 CHLMYD TRACH DNA AMP PROBE: CPT

## 2024-03-27 PROCEDURE — 36415 COLL VENOUS BLD VENIPUNCTURE: CPT

## 2024-03-27 RX ORDER — HEPARIN SODIUM (PORCINE) LOCK FLUSH IV SOLN 100 UNIT/ML 100 UNIT/ML
5 SOLUTION INTRAVENOUS
OUTPATIENT
Start: 2024-03-27

## 2024-03-27 RX ORDER — ALBUTEROL SULFATE 0.83 MG/ML
2.5 SOLUTION RESPIRATORY (INHALATION)
OUTPATIENT
Start: 2024-03-27

## 2024-03-27 RX ORDER — HEPARIN SODIUM,PORCINE 10 UNIT/ML
5-20 VIAL (ML) INTRAVENOUS DAILY PRN
OUTPATIENT
Start: 2024-03-27

## 2024-03-27 RX ORDER — EPINEPHRINE 1 MG/ML
0.3 INJECTION, SOLUTION, CONCENTRATE INTRAVENOUS EVERY 5 MIN PRN
OUTPATIENT
Start: 2024-03-27

## 2024-03-27 RX ORDER — ALBUTEROL SULFATE 90 UG/1
1-2 AEROSOL, METERED RESPIRATORY (INHALATION)
Start: 2024-03-27

## 2024-03-27 RX ORDER — FERROUS SULFATE 325(65) MG
325 TABLET, DELAYED RELEASE (ENTERIC COATED) ORAL EVERY OTHER DAY
Qty: 90 TABLET | Refills: 1 | Status: SHIPPED | OUTPATIENT
Start: 2024-03-27 | End: 2024-09-23

## 2024-03-27 RX ORDER — METHYLPREDNISOLONE SODIUM SUCCINATE 125 MG/2ML
125 INJECTION, POWDER, LYOPHILIZED, FOR SOLUTION INTRAMUSCULAR; INTRAVENOUS
Start: 2024-03-27

## 2024-03-27 RX ORDER — DIPHENHYDRAMINE HYDROCHLORIDE 50 MG/ML
50 INJECTION INTRAMUSCULAR; INTRAVENOUS
Start: 2024-03-27

## 2024-03-27 RX ORDER — MEPERIDINE HYDROCHLORIDE 25 MG/ML
25 INJECTION INTRAMUSCULAR; INTRAVENOUS; SUBCUTANEOUS EVERY 30 MIN PRN
OUTPATIENT
Start: 2024-03-27

## 2024-03-27 SDOH — HEALTH STABILITY: PHYSICAL HEALTH: ON AVERAGE, HOW MANY DAYS PER WEEK DO YOU ENGAGE IN MODERATE TO STRENUOUS EXERCISE (LIKE A BRISK WALK)?: 1 DAY

## 2024-03-27 ASSESSMENT — PATIENT HEALTH QUESTIONNAIRE - PHQ9
SUM OF ALL RESPONSES TO PHQ QUESTIONS 1-9: 6
10. IF YOU CHECKED OFF ANY PROBLEMS, HOW DIFFICULT HAVE THESE PROBLEMS MADE IT FOR YOU TO DO YOUR WORK, TAKE CARE OF THINGS AT HOME, OR GET ALONG WITH OTHER PEOPLE: EXTREMELY DIFFICULT

## 2024-03-27 ASSESSMENT — SOCIAL DETERMINANTS OF HEALTH (SDOH): HOW OFTEN DO YOU GET TOGETHER WITH FRIENDS OR RELATIVES?: ONCE A WEEK

## 2024-03-27 ASSESSMENT — PAIN SCALES - GENERAL: PAINLEVEL: NO PAIN (0)

## 2024-03-27 NOTE — PROGRESS NOTES
Preventive Care Visit  [facility] GREY Ling DNP, Geriatric Medicine  Mar 27, 2024      Assessment & Plan     Annual physical exam  Declined pap and vaccines today  - Lipid Profile  - Comprehensive metabolic panel    Iron deficiency anemia, unspecified iron deficiency anemia type  Hx of iron deficiency, has not regularly taken iron supplement. Follows vegetarian diet. Hgb critically low on lab result after visit, will call patient and recommend IV iron infusion.   - ferrous sulfate (FE TABS) 325 (65 Fe) MG EC tablet; Take 1 tablet (325 mg) by mouth every other day  - CBC with platelets    Irregular menses  Irregular menses with moderate bleeding, will rule out thyroid disorder as cause. Recommend follow-up with OB/GYN for ongoing workup/management given severe anemia  - TSH with free T4 reflex  -OB/GYN Referral    Vitamin D deficiency  Will check for deficiency given vague fatigue  - Vitamin D Deficiency    Screening for HIV (human immunodeficiency virus)  - HIV Antigen Antibody Combo    Need for hepatitis C screening test  - Hepatitis C Screen Reflex to HCV RNA Quant and Genotype    Routine screening for STI (sexually transmitted infection)  Sexually active in the past, not current. Interested in routine screening  - Treponema Abs w Reflex to RPR and Titer  - Trichomonas vaginalis by PCR (first-voided urine)  - First-voided urine-Chlamydia trachomatis/Neisseria gonorrhoeae by PCR    Patient has been advised of split billing requirements and indicates understanding: Yes    40 minutes spent by me on the date of the encounter doing chart review, history and exam, documentation and further activities per the note      Counseling  Appropriate preventive services were discussed with this patient, including applicable screening as appropriate for fall prevention, nutrition, physical activity, Tobacco-use cessation, weight loss and cognition.  Checklist reviewing preventive services available has  been given to the patient.  Reviewed patient's diet, addressing concerns and/or questions.   She is at risk for lack of exercise and has been provided with information to increase physical activity for the benefit of her well-being.   The patient was instructed to see the dentist every 6 months.   She is at risk for psychosocial distress and has been provided with information to reduce risk.       FUTURE APPOINTMENTS:       - Follow-up visit in 2-3 months to recheck iron levels    Emerald Mayorga is a 28 year old, presenting for the following:  Physical and Establish Care         Health Care Directive  Patient does not have a Health Care Directive or Living Will: Discussed advance care planning with patient; however, patient declined at this time.    -Here for physical  -Has history of migraines with aura (vision changes), feels these are much better now. Typically having them about once every 3 months. Feels Advil is effective in treating symptoms.  -Has a history of iron deficiency anemia, had taken iron supplement for about a month several years ago at diagnosis. Does have some fatigue but notes she is working and in school. Follows a vegetarian diet. Has not had this followed-up  -Is having irregular menstrual cycles. Feels they often skip and fluctuates in length (ex: twice/month) and amount of bleeding. Notes these to be heavy but uses about 3 extra large pads each day. Does not describe these as abnormally painful. Denies chance of pregnancy.                    3/27/2024   General Health   How would you rate your overall physical health? Good   Feel stress (tense, anxious, or unable to sleep) Only a little   (!) STRESS CONCERN      3/27/2024   Nutrition   Three or more servings of calcium each day? Yes   Diet: Breakfast skipped   How many servings of fruit and vegetables per day? (!) 2-3   How many sweetened beverages each day? 0-1         3/27/2024   Exercise   Days per week of  moderate/strenous exercise 1 day   (!) EXERCISE CONCERN      3/27/2024   Social Factors   Frequency of gathering with friends or relatives Once a week   Worry food won't last until get money to buy more No   Food not last or not have enough money for food? No   Do you have housing?  Yes   Are you worried about losing your housing? No   Lack of transportation? No   Unable to get utilities (heat,electricity)? No         3/27/2024   Dental   Dentist two times every year? (!) NO         3/27/2024   TB Screening   Were you born outside of the [place]? Yes       Today's PHQ-9 Score:       3/27/2024    10:10 AM   PHQ-9 SCORE   PHQ-9 Total Score MyChart 6 (Mild depression)   PHQ-9 Total Score 3         3/27/2024   Substance Use   Alcohol more than 3/day or more than 7/wk Not Applicable   Do you use any other substances recreationally? No     Social History     Tobacco Use    Smoking status: Never    Smokeless tobacco: Never   Substance Use Topics    Alcohol use: Never    Drug use: Never          Mammogram Screening - Patient under 40 years of age: Routine Mammogram Screening not recommended.           3/27/2024   One time HIV Screening   Previous HIV test? No         3/27/2024   STI Screening   New sexual partner(s) since last STI/HIV test? (!) YES      History of abnormal Pap smear: NO - age 21-29 PAP every 3 years recommended             3/27/2024   Contraception/Family Planning   Questions about contraception or family planning No        Reviewed and updated as needed this visit by Provider   Tobacco   Meds  Problems  Med Hx  Surg Hx  Fam Hx            Past Medical History:   Diagnosis Date    Iron deficiency     Migraine with aura and without status migrainosus, not intractable      History reviewed. No pertinent surgical history.  Lab work is in process      Review of Systems  Constitutional, HEENT, cardiovascular, pulmonary, gi and gu systems are negative, except as otherwise noted.     Objective    Exam  /73  "(BP Location: Right arm, Patient Position: Sitting, Cuff Size: Adult Small)   Pulse 79   Temp 97.3  F (36.3  C) (Temporal)   Resp 15   Ht 1.646 m (5' 4.8\")   Wt 44.6 kg (98 lb 6.4 oz)   LMP 03/23/2024 (Approximate)   SpO2 100%   BMI 16.48 kg/m     Estimated body mass index is 16.48 kg/m  as calculated from the following:    Height as of this encounter: 1.646 m (5' 4.8\").    Weight as of this encounter: 44.6 kg (98 lb 6.4 oz).    Physical Exam  Vitals reviewed.   Constitutional:       Appearance: Normal appearance.   HENT:      Head: Normocephalic.   Eyes:      Pupils: Pupils are equal, round, and reactive to light.   Cardiovascular:      Rate and Rhythm: Normal rate and regular rhythm.      Pulses: Normal pulses.      Heart sounds: Normal heart sounds.   Pulmonary:      Effort: Pulmonary effort is normal.      Breath sounds: Normal breath sounds.   Chest:   Breasts:     Right: Normal. No skin change or tenderness.      Left: Normal. No skin change or tenderness.      Comments: Scattered areas of tense tissue to upper outer quadrants of breasts bilaterally  Abdominal:      Palpations: Abdomen is soft.      Tenderness: There is no abdominal tenderness.   Musculoskeletal:         General: Normal range of motion.      Cervical back: Neck supple.      Right lower leg: No edema.      Left lower leg: No edema.   Skin:     General: Skin is warm.   Neurological:      Mental Status: She is alert and oriented to person, place, and time.   Psychiatric:         Mood and Affect: Mood normal.         Behavior: Behavior normal.               Signed Electronically by: Laura Ling, RAYMOND, APRN, CNP    "

## 2024-03-28 LAB
C TRACH DNA SPEC QL PROBE+SIG AMP: NEGATIVE
HIV 1+2 AB+HIV1 P24 AG SERPL QL IA: NONREACTIVE
N GONORRHOEA DNA SPEC QL NAA+PROBE: NEGATIVE

## 2024-04-05 ENCOUNTER — VIRTUAL VISIT (OUTPATIENT)
Dept: FAMILY MEDICINE | Facility: CLINIC | Age: 29
End: 2024-04-05
Payer: COMMERCIAL

## 2024-04-05 ENCOUNTER — TELEPHONE (OUTPATIENT)
Dept: FAMILY MEDICINE | Facility: CLINIC | Age: 29
End: 2024-04-05

## 2024-04-05 DIAGNOSIS — D50.9 IRON DEFICIENCY ANEMIA, UNSPECIFIED IRON DEFICIENCY ANEMIA TYPE: Primary | ICD-10-CM

## 2024-04-05 DIAGNOSIS — E55.9 VITAMIN D DEFICIENCY: ICD-10-CM

## 2024-04-05 PROCEDURE — 99213 OFFICE O/P EST LOW 20 MIN: CPT | Mod: 95

## 2024-04-05 NOTE — PROGRESS NOTES
Thiago Mayorga is a 28 year old who is being evaluated via a billable video visit.    How would you like to obtain your AVS? MyChart  If the video visit is dropped, the invitation should be resent by: Text to cell phone: [phone]  Will anyone else be joining your video visit? No      Assessment & Plan     Iron deficiency anemia, unspecified iron deficiency anemia type  Was not able to tolerate PO iron supplement, advised that IV iron infusions were ordered and that she should be hearing from scheduling to help get that scheduled soon. She will plan to see OB/GYN on 4/15 to help manage menorrhagia and options to manage menstrual cycle. Discussed she is due for pap as well, she can opt to have that done at that time as well. We will plan to recheck CBC and ferritin in 2-3 months once infusions have begun.  - CBC with platelets; Future  - Ferritin; Future    Vitamin D deficiency  She is taking the prescribed medication without issue, we will recheck levels in 2-3 months  - Vitamin D Deficiency; Future      20 minutes spent by me on the date of the encounter doing chart review, history and exam, documentation and further activities per the note        FUTURE APPOINTMENTS:       - OBGYN on 4/15       - Follow-up office or E-visit and labs in 3 months    Subjective   Thiago Mayorga is a 28 year old, presenting for the following health issues:  Recheck Medication    Had started taking vitamin d and iron supplement. However, she noticed some GI upset with the iron so she.                 Review of Systems  Constitutional, HEENT, cardiovascular, pulmonary, gi and gu systems are negative, except as otherwise noted.      Objective           Vitals:  No vitals were obtained today due to virtual visit.    Physical Exam   GENERAL: alert and no distress  EYES: Eyes grossly normal to inspection.  No discharge or erythema, or obvious scleral/conjunctival abnormalities.  RESP: No audible wheeze, cough, or visible cyanosis.     SKIN: Visible skin clear. No significant rash, abnormal pigmentation or lesions.  NEURO: Cranial nerves grossly intact.  Mentation and speech appropriate for age.  PSYCH: Appropriate affect, tone, and pace of words          Video-Visit Details    Type of service:  Video Visit   Originating Location (pt. Location): Home    Distant Location (provider location):  On-site  Platform used for Video Visit: Pa  Signed Electronically by: Laura Ling, RAYMOND, APRN, CNP

## 2024-04-05 NOTE — TELEPHONE ENCOUNTER
Patient reports iron supplement is causing a burning sensation in her stomach, and would like to discuss other medication options with provider. Patient would like to schedule a video visit. Patient also wants to discuss medications for her mensus.     Appt scheduled    Future Appointments 4/5/2024 - 10/2/2024        Date Visit Type Length Department Provider     4/5/2024  4:30 PM OFFICE VISIT 30 min [facility]/Laura Young DNP    Location Instructions:     [facility] is in [address] of the [facility] at [address] This is just south of [facility] and the [facility] exit off of [address]. Free parking is available; access the lot by turning east from [facility] onto [address]. Through the main entrance, the clinic is directly to the left.              4/15/2024 10:45 AM NEW PATIENT (NON OB) 30 min [facility] OB/GYN Radha Jose APRN CNP    Location Instructions:     The clinic is located at [address], [address] [place] [address]

## 2024-04-11 ENCOUNTER — TELEPHONE (OUTPATIENT)
Dept: FAMILY MEDICINE | Facility: CLINIC | Age: 29
End: 2024-04-11
Payer: COMMERCIAL

## 2024-04-11 NOTE — TELEPHONE ENCOUNTER
Called and spoke with pt to provide her with the infusion center number.   She verbalized understanding and stated she will call to schedule.     Thank you,  Viv Jameson RN

## 2024-04-11 NOTE — TELEPHONE ENCOUNTER
----- Message from Laura Ling DNP sent at 4/5/2024  4:58 PM CDT -----  Please schedule IV Iron infusion. Thank you!    Laura

## 2024-04-29 ENCOUNTER — TELEPHONE (OUTPATIENT)
Dept: INFUSION THERAPY | Facility: CLINIC | Age: 29
End: 2024-04-29
Payer: COMMERCIAL

## 2024-04-29 NOTE — TELEPHONE ENCOUNTER
Called and left message informing that Venofer appointment scheduled for 4/30/24 has been cancelled due to inactive insurance. Advised that if this is incorrect or if she has current insurance to please call us back.

## 2024-06-13 ENCOUNTER — TELEPHONE (OUTPATIENT)
Dept: FAMILY MEDICINE | Facility: CLINIC | Age: 29
End: 2024-06-13
Payer: COMMERCIAL

## 2024-06-13 NOTE — TELEPHONE ENCOUNTER
Laura Ling DNP  [facility]  Does the whole iron infusion order set need to be changed? Let's do Ferrlecit - let me know if the infusion center needs to be called or if the whole order set needs to be changed vs the single med. Thank you!  Laura          Previous Messages       ----- Message -----  From: Mark Villa [facility]  Sent: 6/13/2024   8:21 AM CDT  To: Laura Ling DNP; [facility]; *  Subject: Iron Product Change                              Laura,    Iron sucrose (venofer) is on national shortage with anticipated resolution in July 2024. At this time, we are not starting new patients on venofer. Your patient is scheduled 6/18/24 to receive venofer. Can you please change the therapy plan to an alternative iron product that is allowed by their insurance plan: infed or ferrlecit?    Thank you and have a great day!    Mark Villa, PharmD, MS  Hematology/Oncology Clinical Pharmacist  [facility]  [phone]

## 2024-06-13 NOTE — TELEPHONE ENCOUNTER
MTM - are you able to assist with this?     Triage is not familiar with pending this iron product     Looks like there is 2 options for Ferrlecit - do you know which one to pend for pt?         Thank you   Dorie ARORA, Triage RN  [facility]

## 2024-06-14 DIAGNOSIS — D50.9 IRON DEFICIENCY ANEMIA, UNSPECIFIED IRON DEFICIENCY ANEMIA TYPE: Primary | ICD-10-CM

## 2024-06-14 RX ORDER — DIPHENHYDRAMINE HYDROCHLORIDE 50 MG/ML
50 INJECTION INTRAMUSCULAR; INTRAVENOUS
Status: CANCELLED
Start: 2024-06-14

## 2024-06-14 RX ORDER — ALBUTEROL SULFATE 90 UG/1
1-2 AEROSOL, METERED RESPIRATORY (INHALATION)
Start: 2024-06-18

## 2024-06-14 RX ORDER — HEPARIN SODIUM (PORCINE) LOCK FLUSH IV SOLN 100 UNIT/ML 100 UNIT/ML
5 SOLUTION INTRAVENOUS
OUTPATIENT
Start: 2024-06-18

## 2024-06-14 RX ORDER — MEPERIDINE HYDROCHLORIDE 25 MG/ML
25 INJECTION INTRAMUSCULAR; INTRAVENOUS; SUBCUTANEOUS EVERY 30 MIN PRN
Status: CANCELLED | OUTPATIENT
Start: 2024-06-14

## 2024-06-14 RX ORDER — EPINEPHRINE 1 MG/ML
0.3 INJECTION, SOLUTION, CONCENTRATE INTRAVENOUS EVERY 5 MIN PRN
Status: CANCELLED | OUTPATIENT
Start: 2024-06-14

## 2024-06-14 RX ORDER — ALBUTEROL SULFATE 90 UG/1
1-2 AEROSOL, METERED RESPIRATORY (INHALATION)
Status: CANCELLED
Start: 2024-06-14

## 2024-06-14 RX ORDER — MEPERIDINE HYDROCHLORIDE 25 MG/ML
25 INJECTION INTRAMUSCULAR; INTRAVENOUS; SUBCUTANEOUS EVERY 30 MIN PRN
OUTPATIENT
Start: 2024-06-18

## 2024-06-14 RX ORDER — HEPARIN SODIUM (PORCINE) LOCK FLUSH IV SOLN 100 UNIT/ML 100 UNIT/ML
5 SOLUTION INTRAVENOUS
Status: CANCELLED | OUTPATIENT
Start: 2024-06-14

## 2024-06-14 RX ORDER — HEPARIN SODIUM,PORCINE 10 UNIT/ML
5-20 VIAL (ML) INTRAVENOUS DAILY PRN
Status: CANCELLED | OUTPATIENT
Start: 2024-06-14

## 2024-06-14 RX ORDER — METHYLPREDNISOLONE SODIUM SUCCINATE 125 MG/2ML
125 INJECTION, POWDER, LYOPHILIZED, FOR SOLUTION INTRAMUSCULAR; INTRAVENOUS
Status: CANCELLED
Start: 2024-06-14

## 2024-06-14 RX ORDER — DIPHENHYDRAMINE HYDROCHLORIDE 50 MG/ML
50 INJECTION INTRAMUSCULAR; INTRAVENOUS
Start: 2024-06-18

## 2024-06-14 RX ORDER — METHYLPREDNISOLONE SODIUM SUCCINATE 125 MG/2ML
125 INJECTION, POWDER, LYOPHILIZED, FOR SOLUTION INTRAMUSCULAR; INTRAVENOUS
Start: 2024-06-18

## 2024-06-14 RX ORDER — ALBUTEROL SULFATE 0.83 MG/ML
2.5 SOLUTION RESPIRATORY (INHALATION)
Status: CANCELLED | OUTPATIENT
Start: 2024-06-14

## 2024-06-14 RX ORDER — EPINEPHRINE 1 MG/ML
0.3 INJECTION, SOLUTION, CONCENTRATE INTRAVENOUS EVERY 5 MIN PRN
OUTPATIENT
Start: 2024-06-18

## 2024-06-14 RX ORDER — ALBUTEROL SULFATE 0.83 MG/ML
2.5 SOLUTION RESPIRATORY (INHALATION)
OUTPATIENT
Start: 2024-06-18

## 2024-06-14 RX ORDER — HEPARIN SODIUM,PORCINE 10 UNIT/ML
5-20 VIAL (ML) INTRAVENOUS DAILY PRN
OUTPATIENT
Start: 2024-06-18

## 2024-06-14 NOTE — TELEPHONE ENCOUNTER
See routing comments from infusion pharmacist     Please advise      Dorie ARORA, Triage RN  [facility] Internal [facility]

## 2024-06-18 ENCOUNTER — INFUSION THERAPY VISIT (OUTPATIENT)
Dept: INFUSION THERAPY | Facility: CLINIC | Age: 29
End: 2024-06-18
Payer: COMMERCIAL

## 2024-06-18 VITALS
OXYGEN SATURATION: 100 % | DIASTOLIC BLOOD PRESSURE: 70 MMHG | SYSTOLIC BLOOD PRESSURE: 108 MMHG | RESPIRATION RATE: 18 BRPM | HEART RATE: 70 BPM | TEMPERATURE: 97.8 F

## 2024-06-18 DIAGNOSIS — D50.9 IRON DEFICIENCY ANEMIA, UNSPECIFIED IRON DEFICIENCY ANEMIA TYPE: Primary | ICD-10-CM

## 2024-06-18 PROCEDURE — 250N000011 HC RX IP 250 OP 636

## 2024-06-18 PROCEDURE — 258N000003 HC RX IP 258 OP 636: Mod: JZ

## 2024-06-18 PROCEDURE — 96366 THER/PROPH/DIAG IV INF ADDON: CPT

## 2024-06-18 PROCEDURE — 96376 TX/PRO/DX INJ SAME DRUG ADON: CPT

## 2024-06-18 PROCEDURE — 96365 THER/PROPH/DIAG IV INF INIT: CPT

## 2024-06-18 RX ORDER — MEPERIDINE HYDROCHLORIDE 25 MG/ML
25 INJECTION INTRAMUSCULAR; INTRAVENOUS; SUBCUTANEOUS EVERY 30 MIN PRN
OUTPATIENT
Start: 2024-06-18

## 2024-06-18 RX ORDER — HEPARIN SODIUM (PORCINE) LOCK FLUSH IV SOLN 100 UNIT/ML 100 UNIT/ML
5 SOLUTION INTRAVENOUS
OUTPATIENT
Start: 2024-06-18

## 2024-06-18 RX ORDER — METHYLPREDNISOLONE SODIUM SUCCINATE 125 MG/2ML
125 INJECTION, POWDER, LYOPHILIZED, FOR SOLUTION INTRAMUSCULAR; INTRAVENOUS
Start: 2024-06-18

## 2024-06-18 RX ORDER — EPINEPHRINE 1 MG/ML
0.3 INJECTION, SOLUTION INTRAMUSCULAR; SUBCUTANEOUS EVERY 5 MIN PRN
OUTPATIENT
Start: 2024-06-18

## 2024-06-18 RX ORDER — ALBUTEROL SULFATE 90 UG/1
1-2 AEROSOL, METERED RESPIRATORY (INHALATION)
Start: 2024-06-18

## 2024-06-18 RX ORDER — HEPARIN SODIUM,PORCINE 10 UNIT/ML
5-20 VIAL (ML) INTRAVENOUS DAILY PRN
OUTPATIENT
Start: 2024-06-18

## 2024-06-18 RX ORDER — ALBUTEROL SULFATE 0.83 MG/ML
2.5 SOLUTION RESPIRATORY (INHALATION)
OUTPATIENT
Start: 2024-06-18

## 2024-06-18 RX ORDER — DIPHENHYDRAMINE HYDROCHLORIDE 50 MG/ML
50 INJECTION INTRAMUSCULAR; INTRAVENOUS
Start: 2024-06-18

## 2024-06-18 RX ADMIN — SODIUM CHLORIDE 250 ML: 9 INJECTION, SOLUTION INTRAVENOUS at 09:46

## 2024-06-18 RX ADMIN — SODIUM CHLORIDE 25 MG: 9 INJECTION, SOLUTION INTRAVENOUS at 09:47

## 2024-06-18 RX ADMIN — SODIUM CHLORIDE 975 MG: 9 INJECTION, SOLUTION INTRAVENOUS at 11:03

## 2024-06-18 ASSESSMENT — PAIN SCALES - GENERAL: PAINLEVEL: NO PAIN (0)

## 2024-06-18 NOTE — PROGRESS NOTES
Infusion Nursing Note:  Thiago Mayorga presents today for Infed.    Patient seen by provider today: No   present during visit today: Not Applicable.    Note: N/A.      Intravenous Access:  Peripheral IV placed.    Treatment Conditions:  Not Applicable.      Post Infusion Assessment:  Patient tolerated infusion without incident.  Patient observed for  60 minutes post Infed per protocol.  Site patent and intact, free from redness, edema or discomfort.  No evidence of extravasations.  Access discontinued per protocol.       Discharge Plan:   Discharge instructions reviewed with: Patient.  Patient and/or family verbalized understanding of discharge instructions and all questions answered.  AVS to patient via [employer].  Patient will return prn for next appointment.   Patient discharged in stable condition accompanied by: friend.  Departure Mode: Ambulatory.      Alanis Ogden RN

## 2024-08-13 ENCOUNTER — MYC MEDICAL ADVICE (OUTPATIENT)
Dept: FAMILY MEDICINE | Facility: CLINIC | Age: 29
End: 2024-08-13
Payer: COMMERCIAL

## 2024-08-14 NOTE — TELEPHONE ENCOUNTER
Patient Quality Outreach    Patient is due for the following:   Cervical Cancer Screening - PAP Needed    Next Steps:   Schedule a office visit for PAP EXAM    Type of outreach:    Sent [employer] message.8/13/2024      Questions for provider review:    None           Clover Berman CMA

## 2024-09-19 ENCOUNTER — APPOINTMENT (OUTPATIENT)
Dept: CT IMAGING | Facility: CLINIC | Age: 29
End: 2024-09-19
Attending: EMERGENCY MEDICINE
Payer: COMMERCIAL

## 2024-09-19 ENCOUNTER — HOSPITAL ENCOUNTER (EMERGENCY)
Facility: CLINIC | Age: 29
Discharge: HOME | End: 2024-09-19
Attending: EMERGENCY MEDICINE | Admitting: EMERGENCY MEDICINE
Payer: COMMERCIAL

## 2024-09-19 VITALS
SYSTOLIC BLOOD PRESSURE: 103 MMHG | RESPIRATION RATE: 18 BRPM | OXYGEN SATURATION: 100 % | HEART RATE: 65 BPM | DIASTOLIC BLOOD PRESSURE: 78 MMHG | TEMPERATURE: 98.8 F

## 2024-09-19 DIAGNOSIS — L03.213 PERIORBITAL CELLULITIS OF RIGHT EYE: ICD-10-CM

## 2024-09-19 LAB
ANION GAP SERPL CALCULATED.3IONS-SCNC: 8 MMOL/L (ref 7–15)
BASOPHILS # BLD AUTO: 0 10E3/UL (ref 0–0.2)
BASOPHILS NFR BLD AUTO: 0 %
BUN SERPL-MCNC: 8 MG/DL (ref 6–20)
CALCIUM SERPL-MCNC: 8.8 MG/DL (ref 8.8–10.4)
CHLORIDE SERPL-SCNC: 107 MMOL/L (ref 98–107)
CREAT SERPL-MCNC: 0.49 MG/DL (ref 0.51–0.95)
EGFRCR SERPLBLD CKD-EPI 2021: >90 ML/MIN/1.73M2
EOSINOPHIL # BLD AUTO: 0.1 10E3/UL (ref 0–0.7)
EOSINOPHIL NFR BLD AUTO: 1 %
ERYTHROCYTE [DISTWIDTH] IN BLOOD BY AUTOMATED COUNT: 13.8 % (ref 10–15)
GLUCOSE SERPL-MCNC: 98 MG/DL (ref 70–99)
HCG SERPL QL: NEGATIVE
HCO3 SERPL-SCNC: 24 MMOL/L (ref 22–29)
HCT VFR BLD AUTO: 40.7 % (ref 35–47)
HGB BLD-MCNC: 13.8 G/DL (ref 11.7–15.7)
IMM GRANULOCYTES # BLD: 0 10E3/UL
IMM GRANULOCYTES NFR BLD: 0 %
LYMPHOCYTES # BLD AUTO: 2 10E3/UL (ref 0.8–5.3)
LYMPHOCYTES NFR BLD AUTO: 35 %
MCH RBC QN AUTO: 29.1 PG (ref 26.5–33)
MCHC RBC AUTO-ENTMCNC: 33.9 G/DL (ref 31.5–36.5)
MCV RBC AUTO: 86 FL (ref 78–100)
MONOCYTES # BLD AUTO: 0.3 10E3/UL (ref 0–1.3)
MONOCYTES NFR BLD AUTO: 6 %
NEUTROPHILS # BLD AUTO: 3.4 10E3/UL (ref 1.6–8.3)
NEUTROPHILS NFR BLD AUTO: 58 %
NRBC # BLD AUTO: 0 10E3/UL
NRBC BLD AUTO-RTO: 0 /100
PLATELET # BLD AUTO: 153 10E3/UL (ref 150–450)
POTASSIUM SERPL-SCNC: 3.7 MMOL/L (ref 3.4–5.3)
RBC # BLD AUTO: 4.74 10E6/UL (ref 3.8–5.2)
SODIUM SERPL-SCNC: 139 MMOL/L (ref 135–145)
WBC # BLD AUTO: 5.8 10E3/UL (ref 4–11)

## 2024-09-19 PROCEDURE — 250N000011 HC RX IP 250 OP 636: Performed by: EMERGENCY MEDICINE

## 2024-09-19 PROCEDURE — 70481 CT ORBIT/EAR/FOSSA W/DYE: CPT

## 2024-09-19 PROCEDURE — 250N000009 HC RX 250: Performed by: EMERGENCY MEDICINE

## 2024-09-19 PROCEDURE — 96365 THER/PROPH/DIAG IV INF INIT: CPT | Mod: 59

## 2024-09-19 PROCEDURE — 36415 COLL VENOUS BLD VENIPUNCTURE: CPT | Performed by: EMERGENCY MEDICINE

## 2024-09-19 PROCEDURE — 80048 BASIC METABOLIC PNL TOTAL CA: CPT | Performed by: EMERGENCY MEDICINE

## 2024-09-19 PROCEDURE — 96366 THER/PROPH/DIAG IV INF ADDON: CPT

## 2024-09-19 PROCEDURE — 84703 CHORIONIC GONADOTROPIN ASSAY: CPT | Performed by: EMERGENCY MEDICINE

## 2024-09-19 PROCEDURE — 85025 COMPLETE CBC W/AUTO DIFF WBC: CPT | Performed by: EMERGENCY MEDICINE

## 2024-09-19 PROCEDURE — 99285 EMERGENCY DEPT VISIT HI MDM: CPT | Mod: 25

## 2024-09-19 RX ORDER — LORATADINE 10 MG/1
10 TABLET ORAL DAILY
Qty: 10 TABLET | Refills: 0 | Status: SHIPPED | OUTPATIENT
Start: 2024-09-19 | End: 2024-09-29

## 2024-09-19 RX ORDER — SULFAMETHOXAZOLE/TRIMETHOPRIM 800-160 MG
1 TABLET ORAL 2 TIMES DAILY
Qty: 10 TABLET | Refills: 0 | Status: SHIPPED | OUTPATIENT
Start: 2024-09-19 | End: 2024-09-24

## 2024-09-19 RX ORDER — IOPAMIDOL 755 MG/ML
67 INJECTION, SOLUTION INTRAVASCULAR ONCE
Status: COMPLETED | OUTPATIENT
Start: 2024-09-19 | End: 2024-09-19

## 2024-09-19 RX ORDER — CEPHALEXIN 500 MG/1
500 CAPSULE ORAL 4 TIMES DAILY
Qty: 20 CAPSULE | Refills: 0 | Status: SHIPPED | OUTPATIENT
Start: 2024-09-19 | End: 2024-09-24

## 2024-09-19 RX ORDER — PROPARACAINE HYDROCHLORIDE 5 MG/ML
1 SOLUTION/ DROPS OPHTHALMIC ONCE
Status: DISCONTINUED | OUTPATIENT
Start: 2024-09-19 | End: 2024-09-19 | Stop reason: HOSPADM

## 2024-09-19 RX ORDER — IBUPROFEN 600 MG/1
600 TABLET, FILM COATED ORAL EVERY 6 HOURS PRN
Qty: 40 TABLET | Refills: 0 | Status: SHIPPED | OUTPATIENT
Start: 2024-09-19 | End: 2024-09-29

## 2024-09-19 RX ORDER — LIDOCAINE 40 MG/G
CREAM TOPICAL
Status: DISCONTINUED | OUTPATIENT
Start: 2024-09-19 | End: 2024-09-19 | Stop reason: HOSPADM

## 2024-09-19 RX ORDER — CEFTRIAXONE 2 G/1
2 INJECTION, POWDER, FOR SOLUTION INTRAMUSCULAR; INTRAVENOUS ONCE
Status: COMPLETED | OUTPATIENT
Start: 2024-09-19 | End: 2024-09-19

## 2024-09-19 RX ADMIN — SODIUM CHLORIDE 60 ML: 9 INJECTION, SOLUTION INTRAVENOUS at 16:09

## 2024-09-19 RX ADMIN — IOPAMIDOL 67 ML: 755 INJECTION, SOLUTION INTRAVENOUS at 16:09

## 2024-09-19 RX ADMIN — CEFTRIAXONE SODIUM 2 G: 2 INJECTION, POWDER, FOR SOLUTION INTRAMUSCULAR; INTRAVENOUS at 15:58

## 2024-09-19 ASSESSMENT — ACTIVITIES OF DAILY LIVING (ADL)
ADLS_ACUITY_SCORE: 35

## 2024-09-19 ASSESSMENT — VISUAL ACUITY
OD: HAND MOTION
OS: 20/20

## 2024-09-19 NOTE — LETTER
September 19, 2024      To Whom It May Concern:      Thiago Mayorga was seen in our Emergency Department today, 09/19/24.  Please excuse her from work until  9/22/24. She may return to work/school when improved.    Sincerely,        Queta Rosa MD

## 2024-09-19 NOTE — ED PROVIDER NOTES
History     Chief Complaint:  Eye Problem       HPI   Firehiwot Jair Mayorga is a 28 year old female who comes with a few days of itching in her right eye with swelling above her right eye now she has marked swelling over the upper and the lower lid and difficulty opening her eye because of the swelling.  She reports that it is quite painful and is pushing down on her eye.  She has not had a fever.  She has had a chalazion on the left lower lid but this seems unrelated.  She states that she may have got some powder into her eye from a gender reveal party over the weekend but she is not sure.  She assumed that is what was causing the itching previously.  She has not tried any antihistamines or other treatment for it.  No drainage.  She does not wear glasses or contact lenses.  She does not remember who she followed up with for her chalazion.    Independent Historian:    None    Review of External Notes:  Reviewed previous emergency department visit for lower eyelid chalazion left eye.  Always noted to have been in the same spot.  Reviewed virtual visit for iron deficiency anemia on 4/5/2024.  Patient had iron infusions ordered.    Medications:    cephALEXin (KEFLEX) 500 MG capsule  ibuprofen (ADVIL/MOTRIN) 600 MG tablet  loratadine (CLARITIN) 10 MG tablet  sulfamethoxazole-trimethoprim (BACTRIM DS) 800-160 MG tablet  ferrous sulfate (FE TABS) 325 (65 Fe) MG EC tablet  MAGNESIUM OXIDE 400 (240 Mg) MG tablet      Past Medical History:    Past Medical History:   Diagnosis Date    Iron deficiency     Migraine with aura and without status migrainosus, not intractable        Past Surgical History:    No past surgical history on file.       Physical Exam   Patient Vitals for the past 24 hrs:   BP Temp Temp src Pulse Resp SpO2   09/19/24 1822 103/78 -- -- 65 -- 100 %   09/19/24 1336 98/71 -- -- -- -- --   09/19/24 1335 -- 98.8  F (37.1  C) Temporal 62 18 100 %        Physical Exam  General: Well-nourished, appears to  be in pain  Eyes: Right eye with marked periorbital edema, worse over the upper eyelid.  Erythematous.  No drainage.  Patient is able to partially open her eye.  I do not see any conjunctival injection.  No fluorescein uptake.  Pupil equal round and reactive.  No obvious foreign body.  Left eye is normal.  Patient does report pain when looking up.    ENT:  Moist mucus membranes, posterior oropharynx clear without erythema or exudates  Respiratory:  Lungs clear to auscultation bilaterally, no crackles/rubs/wheezes.  Good air movement  CV: Normal rate and rhythm, no murmurs/rubs/gallops  GI:  Abdomen soft and non-distended.  Normoactive BS.  No tenderness, guarding or rebound  Skin: Warm, dry.  No rashes or petechiae  Musculoskeletal: No peripheral edema or calf tenderness  Neuro: Alert and oriented to person/place/time  Psychiatric: Normal affect      Emergency Department Course     Imaging:  CT Orbits w Contrast   Final Result   IMPRESSION:    1.  Mild right periorbital soft tissue swelling without organized fluid collections suggest abscess formation. No retroconal fat stranding.          Laboratory:  Labs Ordered and Resulted from Time of ED Arrival to Time of ED Departure   BASIC METABOLIC PANEL - Abnormal       Result Value    Sodium 139      Potassium 3.7      Chloride 107      Carbon Dioxide (CO2) 24      Anion Gap 8      Urea Nitrogen 8.0      Creatinine 0.49 (*)     GFR Estimate >90      Calcium 8.8      Glucose 98     HCG QUALITATIVE PREGNANCY - Normal    hCG Serum Qualitative Negative     CBC WITH PLATELETS AND DIFFERENTIAL    WBC Count 5.8      RBC Count 4.74      Hemoglobin 13.8      Hematocrit 40.7      MCV 86      MCH 29.1      MCHC 33.9      RDW 13.8      Platelet Count 153      % Neutrophils 58      % Lymphocytes 35      % Monocytes 6      % Eosinophils 1      % Basophils 0      % Immature Granulocytes 0      NRBCs per 100 WBC 0      Absolute Neutrophils 3.4      Absolute Lymphocytes 2.0       Absolute Monocytes 0.3      Absolute Eosinophils 0.1      Absolute Basophils 0.0      Absolute Immature Granulocytes 0.0      Absolute NRBCs 0.0          Emergency Department Course & Assessments:    Interventions:  Medications   cefTRIAXone (ROCEPHIN) 2 g vial to attach to  ml bag for ADULTS or NS 50 ml bag for PEDS (0 g Intravenous Stopped 9/19/24 1741)   Saline Flush - CT (60 mLs Intravenous $Given 9/19/24 1609)   iopamidol (ISOVUE-370) solution 67 mL (67 mLs Intravenous $Given 9/19/24 1609)        Assessments:  I evaluated and assessed patient.  Discussed plan of care.  I performed a fluorescein and eye examination.  I discussed the plan for discharge and follow-up.    Independent Interpretation (X-rays, CTs, rhythm strip):  None    Consultations/Discussion of Management or Tests:  None       Social Determinants of Health affecting care:  None     Disposition:  The patient was discharged.    Impression & Plan       Medical Decision Making:  Thiago Mayorga is a 28 year old female who comes with marked swelling over and around the right eye.  It started with itching but I am concerned that this is more than an allergic reaction.  Given the pain with extraocular motion and looking up, I was concerned about the possibility of a orbital cellulitis.  Laboratory studies were obtained.  These were reassuring.  A CT scan was obtained and this shows findings of periorbital cellulitis but no orbital cellulitis.  I do not see any evidence of a corneal abrasion, dendritic lesions or other abnormalities on the cornea.  The patient was given a dose of intravenous antibiotics here and will be discharged home on antibiotics as well as ibuprofen to decrease swelling and Claritin for the possible histamine reaction.  The patient is instructed to elevate her head and sleep sitting up if possible.  She is to follow-up with ophthalmology and a referral was made within our epic computer system.  She is to return if  any worsening.  At this time, with reasonable clinical confidence, I do believe she safe for discharge home.    Diagnosis:    ICD-10-CM    1. Periorbital cellulitis of right eye  L03.213 Adult Eye  Referral           Discharge Medications:  Discharge Medication List as of 9/19/2024  6:22 PM        START taking these medications    Details   cephALEXin (KEFLEX) 500 MG capsule Take 1 capsule (500 mg) by mouth 4 times daily for 5 days., Disp-20 capsule, R-0, E-Prescribe      ibuprofen (ADVIL/MOTRIN) 600 MG tablet Take 1 tablet (600 mg) by mouth every 6 hours as needed for other or mild pain (swelling)., Disp-40 tablet, R-0, E-Prescribe      loratadine (CLARITIN) 10 MG tablet Take 1 tablet (10 mg) by mouth daily for 10 days., Disp-10 tablet, R-0, E-Prescribe      sulfamethoxazole-trimethoprim (BACTRIM DS) 800-160 MG tablet Take 1 tablet by mouth 2 times daily for 5 days., Disp-10 tablet, R-0, E-Prescribe              9/19/2024   Queta Rosa MD Cho, Amy C, MD  09/19/24 3846

## 2024-09-19 NOTE — DISCHARGE INSTRUCTIONS
*Elevate your head when you sleep.  *Cephalexin and Bactrim as directed.  Ibuprofen as directed.  Claritin as directed.  *You should follow-up with an ophthalmologist in the next few days.  I did make a referral to try to get you an appointment with ophthalmology at [facility].  You should receive a call but you should also try to call to get an appointment yourself.  *Return if you develop fever, vision changes, vomiting, faint or feel like you will faint or become worse in any way.     Attending Attestation (For Attendings USE Only)...

## 2024-09-20 ENCOUNTER — PATIENT OUTREACH (OUTPATIENT)
Dept: FAMILY MEDICINE | Facility: CLINIC | Age: 29
End: 2024-09-20
Payer: COMMERCIAL

## 2024-09-20 ENCOUNTER — TELEPHONE (OUTPATIENT)
Dept: OPHTHALMOLOGY | Facility: CLINIC | Age: 29
End: 2024-09-20
Payer: COMMERCIAL

## 2024-09-20 NOTE — TELEPHONE ENCOUNTER
Health Call Center    Phone Message    May a detailed message be left on voicemail: yes     Reason for Call: Appointment Intake    Referring Provider Name: Queta Rosa MD   Diagnosis and/or Symptoms: periorbital cellulitis, history of chalazion     Other:  Writer unable to schedule due to multiple diagnosis and protocol.  Please advise and contact patient to schedule at [phone].  Thank you!     Action Taken: Message routed to:  [facility]     Travel Screening: Not Applicable     Date of Service:                                                                      Cardiovascular Disease Progress Note  Date of Service: 01-18-24 @ 10:22    Overnight events: No acute events overnight.    The patient is laying flat and comfortably on room air.        Objective Findings:  T(C): 37.3 (01-17-24 @ 22:00), Max: 37.3 (01-17-24 @ 22:00)  HR: 63 (01-17-24 @ 22:00) (61 - 66)  BP: 141/49 (01-17-24 @ 22:00) (127/58 - 168/71)  RR: 17 (01-17-24 @ 22:00) (16 - 18)  SpO2: 100% (01-17-24 @ 22:00) (100% - 100%)  Wt(kg): --  Daily     Daily       Physical Exam:  Gen: NAD; Patient resting comfortably  HEENT: EOMI, Normocephalic/ atraumatic  CV: RRR, normal S1 + S2, no m/r/g  Lungs:  Normal respiratory effort; clear to auscultation bilaterally  Abd: soft, non-tender; bowel sounds present  Ext: No edema; warm and well perfused    Telemetry: Sinus; no ectopy    Laboratory Data:                        11.1   6.39  )-----------( 219      ( 18 Jan 2024 06:11 )             37.7     01-18    141  |  97<L>  |  14  ----------------------------<  102<H>  4.5   |  24  |  3.77<H>    Ca    9.0      18 Jan 2024 06:11  Phos  2.5     01-18  Mg     2.30     01-18                Inpatient Medications:  MEDICATIONS  (STANDING):  atorvastatin 20 milliGRAM(s) Oral at bedtime  calcium acetate 1334 milliGRAM(s) Oral two times a day with meals  carvedilol 12.5 milliGRAM(s) Oral every 12 hours  chlorhexidine 4% Liquid 1 Application(s) Topical daily  dextrose 5% + sodium chloride 0.9%. 1000 milliLiter(s) (40 mL/Hr) IV Continuous <Continuous>  dextrose 5%. 1000 milliLiter(s) (100 mL/Hr) IV Continuous <Continuous>  dextrose 5%. 1000 milliLiter(s) (50 mL/Hr) IV Continuous <Continuous>  dextrose 50% Injectable 12.5 Gram(s) IV Push once  dextrose 50% Injectable 25 Gram(s) IV Push once  dextrose 50% Injectable 25 Gram(s) IV Push once  glucagon  Injectable 1 milliGRAM(s) IntraMuscular once  heparin   Injectable 5000 Unit(s) SubCutaneous every 12 hours  hydrALAZINE 100 milliGRAM(s) Oral three times a day  insulin lispro (ADMELOG) corrective regimen sliding scale   SubCutaneous three times a day before meals  insulin lispro (ADMELOG) corrective regimen sliding scale   SubCutaneous at bedtime  NIFEdipine XL 60 milliGRAM(s) Oral two times a day      Assessment: 72 year old man with HTN, HLD, T2DM on insulin, and ESRD on HD presents with supply demand ischemia and HTN urgency.     Plan of Care:    #Supply demand ischemia-  Secondary to HTN urgency.  BP is now much improved post HD.   No acute findings on echocardiogram.   No plan for ischemic work up at this time given normal wall motion on echo, medication nonadherence, and the absence of chest pain.     #Sinus bradycardia-  No evidence of AV block on admission EKG or telemetry.  No indication for pacing at this time.     #HTN urgency-  BP now improved post HD.    #ESRD-  HD as per renal.           Over 55 minutes spent on total encounter; more than 50% of the visit was spent counseling and/or coordinating care by the attending physician.      Geovani Bravo MD [facility]  Cardiovascular Disease  [phone]

## 2024-09-20 NOTE — TELEPHONE ENCOUNTER
"  Transitions of Care Outreach  Chief Complaint   Patient presents with    Hospital F/U       Most Recent Admission Date: 9/19/2024   Most Recent Admission Diagnosis:      Most Recent Discharge Date: 9/19/2024   Most Recent Discharge Diagnosis: Periorbital cellulitis of right eye - L03.213     Transitions of Care Assessment    Discharge Assessment  How are you doing now that you are home?: \"its not going down and painful.\"  How are your symptoms? (Red Flag symptoms escalate to triage hotline per guidelines): Unchanged  Do you know how to contact your clinic care team if you have future questions or changes to your health status? : Yes  Does the patient have their discharge instructions? : No - Review discharge instructions  Does the patient have questions regarding their discharge instructions? : No  Were you started on any new medications or were there changes to any of your previous medications? : No  Does the patient have all of their medications?: Yes  Do you have questions regarding any of your medications? : No  Do you have all of your needed medical supplies or equipment (DME)?  (i.e. oxygen tank, CPAP, cane, etc.): Yes    Follow up Plan     Discharge Follow-Up  Discharge follow up appointment scheduled in alignment with recommended follow up timeframe or Transitions of Risk Category? (Low = within 30 days; Moderate= within 14 days; High= within 7 days): Yes  Discharge Follow Up Appointment Date: 09/23/24  Discharge Follow Up Appointment Scheduled with?: Primary Care Provider    Future Appointments   Date Time Provider Department Center   9/23/2024 11:00 AM Laura Ling DNP [facility]       Outpatient Plan as outlined on AVS reviewed with patient.    For any urgent concerns, please contact our 24 hour nurse triage line: [phone])       Paula Chandra RN     "

## 2024-09-20 NOTE — TELEPHONE ENCOUNTER
Called and spoke to Ilene     It was really hard to hear her - her phone had echo -     Made an appointment with Dr. Conroy in Oct     Discussed     Wait time     Billing / insurance     Clinic address     New pt packet     Alis Gong Communication Facilitator on 9/20/2024 at 4:41 PM

## 2024-09-23 ENCOUNTER — OFFICE VISIT (OUTPATIENT)
Dept: FAMILY MEDICINE | Facility: CLINIC | Age: 29
End: 2024-09-23
Payer: COMMERCIAL

## 2024-09-23 VITALS
DIASTOLIC BLOOD PRESSURE: 71 MMHG | TEMPERATURE: 97.8 F | SYSTOLIC BLOOD PRESSURE: 104 MMHG | BODY MASS INDEX: 17 KG/M2 | WEIGHT: 102 LBS | RESPIRATION RATE: 16 BRPM | HEART RATE: 77 BPM | OXYGEN SATURATION: 99 % | HEIGHT: 65 IN

## 2024-09-23 DIAGNOSIS — H57.9 ITCH OF RIGHT EYE: Primary | ICD-10-CM

## 2024-09-23 DIAGNOSIS — D50.9 IRON DEFICIENCY ANEMIA, UNSPECIFIED IRON DEFICIENCY ANEMIA TYPE: ICD-10-CM

## 2024-09-23 DIAGNOSIS — H02.843 EYELID GLAND SWELLING, RIGHT: ICD-10-CM

## 2024-09-23 PROCEDURE — 99213 OFFICE O/P EST LOW 20 MIN: CPT

## 2024-09-23 RX ORDER — ACETAMINOPHEN 500 MG
TABLET ORAL
COMMUNITY

## 2024-09-23 RX ORDER — OLOPATADINE HYDROCHLORIDE 1 MG/ML
1 SOLUTION/ DROPS OPHTHALMIC 2 TIMES DAILY
Qty: 5 ML | Refills: 0 | Status: SHIPPED | OUTPATIENT
Start: 2024-09-23

## 2024-09-23 RX ORDER — CITALOPRAM HYDROBROMIDE 10 MG/1
TABLET ORAL
COMMUNITY

## 2024-09-23 ASSESSMENT — PAIN SCALES - GENERAL: PAINLEVEL: EXTREME PAIN (8)

## 2024-09-23 NOTE — LETTER
September 23, 2024      Thiago Mayorga  [address]  [place] [address]        To Whom It May Concern:    Thiago Mayorga was seen in our clinic on 9/23/24. She may return to work without restrictions on 9/25/24.      Sincerely,        Laura Ling, DNP, APRN, CNP

## 2024-09-23 NOTE — PROGRESS NOTES
Assessment & Plan     Itch of right eye  Eyelid gland swelling, right  Overall symptoms are improving but continues to have some upper eyelid swelling. No red flag symptoms. Advised to continue abx until completion, can introduce antihistamine and lubricating eye gtts; will send to pharmacy. Advised to use warm compresses/warm washcloths 3-4x/day   - olopatadine (PATANOL) 0.1 % ophthalmic solution; Place 1 drop into the right eye 2 times daily.  - dextran 70-hypromellose (TEARS NATURALE FREE PF) 0.1-0.3 % ophthalmic solution; Place 1 drop into the right eye daily as needed (for dry eye).    Iron deficiency anemia, unspecified iron deficiency anemia type  Reviewed recent labs, significant improvement noted. Patient feels overall significantly improved. Was not able to follow-up with [employer] d/t insurance coverage, does continue to have somewhat irregular cycles that are reported as not heavy. Advised to follow-up in 3-6 months to repeat labs and revisit possible contraception options if menstrual cycle is bothersome        MED REC REQUIRED  Post Medication Reconciliation Status:       FUTURE APPOINTMENTS:       - Follow-up visit in 3-6 months       - Follow-up for annual visit or as needed    Subjective   Thiago is a 28 year old, presenting for the following health issues:  ER F/U    Was recently seen in the ER for right eye swelling  Initially started as itching and then swelling happened  Was seen in the ER and did a CT scan and prescribed Keflex and Bactrim  Continuing the antibiotic  Still feels some itching and burning in her eye but the swelling has improved. No generalized pain  No vision changes - was blurry initially but that has improved  No fevers/chills, URI sx  Does not wear contacts    History of Present Illness       Reason for visit:  My eyes   She is taking medications regularly.         ED/UC Followup:    Facility:  [facility]  Date of visit: 9/19/24  Reason for visit: EYE PAIN   Current  "Status: improving        Review of Systems  Constitutional, HEENT, cardiovascular, pulmonary, gi and gu systems are negative, except as otherwise noted.      Objective    /71 (BP Location: Right arm, Patient Position: Sitting, Cuff Size: Adult Regular)   Pulse 77   Temp 97.8  F (36.6  C) (Oral)   Resp 16   Ht 1.646 m (5' 4.8\")   Wt 46.3 kg (102 lb)   LMP 08/23/2024   SpO2 99%   Breastfeeding No   BMI 17.08 kg/m    Body mass index is 17.08 kg/m .  Physical Exam  Vitals reviewed.   Constitutional:       Appearance: Normal appearance.   HENT:      Head: Normocephalic.   Eyes:      Pupils: Pupils are equal, round, and reactive to light.   Cardiovascular:      Rate and Rhythm: Normal rate.   Pulmonary:      Effort: Pulmonary effort is normal. No respiratory distress.   Musculoskeletal:         General: Normal range of motion.   Skin:     General: Skin is warm.   Neurological:      Mental Status: She is alert and oriented to person, place, and time.   Psychiatric:         Mood and Affect: Mood normal.         Behavior: Behavior normal.                    Signed Electronically by: Laura Ling, DNP, APRN, CNP    "

## 2024-09-25 ENCOUNTER — VIRTUAL VISIT (OUTPATIENT)
Dept: FAMILY MEDICINE | Facility: CLINIC | Age: 29
End: 2024-09-25
Payer: COMMERCIAL

## 2024-09-25 DIAGNOSIS — N92.1 MENORRHAGIA WITH IRREGULAR CYCLE: Primary | ICD-10-CM

## 2024-09-25 DIAGNOSIS — D50.9 IRON DEFICIENCY ANEMIA, UNSPECIFIED IRON DEFICIENCY ANEMIA TYPE: ICD-10-CM

## 2024-09-25 DIAGNOSIS — Z02.89 ENCOUNTER FOR COMPLETION OF FORM WITH PATIENT: ICD-10-CM

## 2024-09-25 DIAGNOSIS — Z30.011 ENCOUNTER FOR INITIAL PRESCRIPTION OF CONTRACEPTIVE PILLS: ICD-10-CM

## 2024-09-25 PROCEDURE — 99442 PR PHYSICIAN TELEPHONE EVALUATION 11-20 MIN: CPT | Mod: 93

## 2024-09-25 RX ORDER — NORETHINDRONE ACETATE AND ETHINYL ESTRADIOL 1MG-20(21)
1 KIT ORAL DAILY
Qty: 84 TABLET | Refills: 1 | Status: SHIPPED | OUTPATIENT
Start: 2024-09-25

## 2024-09-25 NOTE — PROGRESS NOTES
Thiago is a 28 year old who is being evaluated via a billable telephone visit.    What phone number would you like to be contacted at? [phone]   How would you like to obtain your AVS? Trung  Originating Location (pt. Location): Home    Distant Location (provider location):  On-site    Assessment & Plan     Menorrhagia with irregular cycle  Iron deficiency anemia, unspecified iron deficiency anemia type  Encounter for initial prescription of contraceptive pills  Continues to have menorrhagia with irregular cycle with history of anemia, improved after iron infusion.  Counseled on contraceptives including contraindications, indications for use, SE, and initiation to start.  Reports no chance of pregnancy with LMP starting within past 1-2 days.  Will start COCs as soon as she gets prescription, advised to use backup protection for 7 days if sexually active.  Will plan to follow-up in 3 months to see how she is tolerating medication and reassess symptoms  - norethindrone-ethinyl estradiol (JUNEL FE 1/20) 1-20 MG-MCG tablet; Take 1 tablet by mouth daily.    Encounter for completion of form with patient  Employer is requesting paperwork to support missing 5 days of work so we completed this together.  Overall her eye symptoms have improved and she plans to return to work today.  I will fax over the forms today.            FUTURE APPOINTMENTS:       - Follow-up office or E-visit in 3 months    Subjective   Thiago is a 28 year old, presenting for the following health issues:  Forms    Continues to have irregular periods, notes her most recent one was very heavy. No pain. Having to change XL pad or tampon every 1-2 hours    History of Present Illness       Reason for visit:  My eyes   She is taking medications regularly.               Review of Systems  Constitutional, HEENT, cardiovascular, pulmonary, gi and gu systems are negative, except as otherwise noted.      Objective           Vitals:  No vitals were  obtained today due to virtual visit.    Physical Exam   General: Alert and no distress //Respiratory: No audible wheeze, cough, or shortness of breath // Psychiatric:  Appropriate affect, tone, and pace of words            Phone call duration: 17 minutes  Signed Electronically by: Larua Ling, DNP, APRN, CNP

## 2024-10-10 ASSESSMENT — PATIENT HEALTH QUESTIONNAIRE - PHQ9: SUM OF ALL RESPONSES TO PHQ QUESTIONS 1-9: 6

## 2025-01-14 ENCOUNTER — VIRTUAL VISIT (OUTPATIENT)
Dept: FAMILY MEDICINE | Facility: CLINIC | Age: 30
End: 2025-01-14
Payer: COMMERCIAL

## 2025-01-14 DIAGNOSIS — N92.1 MENORRHAGIA WITH IRREGULAR CYCLE: Primary | ICD-10-CM

## 2025-01-14 DIAGNOSIS — D50.9 IRON DEFICIENCY ANEMIA, UNSPECIFIED IRON DEFICIENCY ANEMIA TYPE: ICD-10-CM

## 2025-01-14 PROCEDURE — 98005 SYNCH AUDIO-VIDEO EST LOW 20: CPT

## 2025-01-14 NOTE — PROGRESS NOTES
"Thiago is a 29 year old who is being evaluated via a billable video visit.    How would you like to obtain your AVS? MyChart  If the video visit is dropped, the invitation should be resent by: Text to cell phone: [phone]  Will anyone else be joining your video visit? No      Assessment & Plan     Menorrhagia with irregular cycle  Iron deficiency anemia, unspecified iron deficiency anemia type  Overall going ok, admits to not having made any changes since our previous visit. Reiterated and discussed the plan to take OCPs consistently and continuously to see if this helps regulate menstrual cycle and control bleeding. Discussed that effectiveness is increased if taking consistently. Plans to travel to [facility] for 3 months to see her . Advised back up contraception to help aid in pregnancy prevention if desired. Also advised to trial OTC \"Slow Fe\" to aid in increased iron levels and avoid further anemia. She has not yet done the vaginal ultrasound so I provided her with phone number to radiology to schedule. Plan to check in once she returns to let me know how continuous OCPs have helped regulate menstrual cycle and bleeding.            MEDICATIONS:        - Start taking previously prescribed OCPs and iron supplement       - Continue other medications without change  FURTHER TESTING:       - vaginal ultrasound  FUTURE APPOINTMENTS:       - Follow-up office or E-visit in 3-6 months    Emerald Das is a 29 year old, presenting for the following health issues:  Follow Up (Patient is having a virtual follow up from previous visit.)    History of Present Illness       Reason for visit:  Follow up with primary care provider.   She is taking medications regularly.               Review of Systems  Constitutional, HEENT, cardiovascular, pulmonary, gi and gu systems are negative, except as otherwise noted.      Objective           Vitals:  No vitals were obtained today due to virtual visit.    Physical " Exam   GENERAL: alert and no distress  EYES: Eyes grossly normal to inspection.  No discharge or erythema, or obvious scleral/conjunctival abnormalities.  RESP: No audible wheeze, cough, or visible cyanosis.    SKIN: Visible skin clear. No significant rash, abnormal pigmentation or lesions.  NEURO: Cranial nerves grossly intact.  Mentation and speech appropriate for age.  PSYCH: Appropriate affect, tone, and pace of words          Video-Visit Details    Type of service:  Video Visit   Originating Location (pt. Location): Home  Distant Location (provider location):  On-site  Platform used for Video Visit: Pa  Signed Electronically by: Laura Ling, DNP, APRN, CNP

## 2025-06-07 ENCOUNTER — HEALTH MAINTENANCE LETTER (OUTPATIENT)
Age: 30
End: 2025-06-07

## 2025-07-31 ENCOUNTER — HOSPITAL ENCOUNTER (EMERGENCY)
Facility: CLINIC | Age: 30
Discharge: LEFT BEFORE DISCHARGE | End: 2025-07-31
Attending: EMERGENCY MEDICINE | Admitting: EMERGENCY MEDICINE
Payer: COMMERCIAL

## 2025-07-31 ENCOUNTER — HOSPITAL ENCOUNTER (EMERGENCY)
Facility: CLINIC | Age: 30
Discharge: HOME | End: 2025-07-31
Attending: EMERGENCY MEDICINE
Payer: COMMERCIAL

## 2025-07-31 VITALS
OXYGEN SATURATION: 100 % | HEART RATE: 81 BPM | DIASTOLIC BLOOD PRESSURE: 77 MMHG | RESPIRATION RATE: 16 BRPM | SYSTOLIC BLOOD PRESSURE: 113 MMHG | TEMPERATURE: 97.4 F

## 2025-07-31 VITALS
HEART RATE: 88 BPM | RESPIRATION RATE: 16 BRPM | SYSTOLIC BLOOD PRESSURE: 97 MMHG | DIASTOLIC BLOOD PRESSURE: 69 MMHG | OXYGEN SATURATION: 99 % | TEMPERATURE: 97.8 F

## 2025-07-31 DIAGNOSIS — Z86.69 HISTORY OF MIGRAINE: ICD-10-CM

## 2025-07-31 DIAGNOSIS — R51.9 ACUTE NONINTRACTABLE HEADACHE, UNSPECIFIED HEADACHE TYPE: Primary | ICD-10-CM

## 2025-07-31 LAB
D DIMER PPP FEU-MCNC: 0.46 UG/ML FEU (ref 0–0.5)
HCG SERPL QL: NEGATIVE
HOLD SPECIMEN: NORMAL
T4 FREE SERPL-MCNC: 1.09 NG/DL (ref 0.9–1.7)
TSH SERPL DL<=0.005 MIU/L-ACNC: 5.62 UIU/ML (ref 0.3–4.2)

## 2025-07-31 PROCEDURE — 85379 FIBRIN DEGRADATION QUANT: CPT | Performed by: EMERGENCY MEDICINE

## 2025-07-31 PROCEDURE — 258N000003 HC RX IP 258 OP 636: Performed by: EMERGENCY MEDICINE

## 2025-07-31 PROCEDURE — 99281 EMR DPT VST MAYX REQ PHY/QHP: CPT | Performed by: EMERGENCY MEDICINE

## 2025-07-31 PROCEDURE — 250N000011 HC RX IP 250 OP 636: Performed by: EMERGENCY MEDICINE

## 2025-07-31 PROCEDURE — 96375 TX/PRO/DX INJ NEW DRUG ADDON: CPT

## 2025-07-31 PROCEDURE — 84439 ASSAY OF FREE THYROXINE: CPT | Performed by: EMERGENCY MEDICINE

## 2025-07-31 PROCEDURE — 36415 COLL VENOUS BLD VENIPUNCTURE: CPT | Performed by: EMERGENCY MEDICINE

## 2025-07-31 PROCEDURE — 99284 EMERGENCY DEPT VISIT MOD MDM: CPT | Mod: 25 | Performed by: EMERGENCY MEDICINE

## 2025-07-31 PROCEDURE — 84703 CHORIONIC GONADOTROPIN ASSAY: CPT | Performed by: EMERGENCY MEDICINE

## 2025-07-31 PROCEDURE — 96374 THER/PROPH/DIAG INJ IV PUSH: CPT

## 2025-07-31 PROCEDURE — 84443 ASSAY THYROID STIM HORMONE: CPT | Performed by: EMERGENCY MEDICINE

## 2025-07-31 RX ORDER — DIPHENHYDRAMINE HYDROCHLORIDE 50 MG/ML
25 INJECTION, SOLUTION INTRAMUSCULAR; INTRAVENOUS ONCE
Status: COMPLETED | OUTPATIENT
Start: 2025-07-31 | End: 2025-07-31

## 2025-07-31 RX ORDER — FLUORESCEIN SODIUM 1 MG/MG
1 STRIP OPHTHALMIC ONCE
Status: DISCONTINUED | OUTPATIENT
Start: 2025-07-31 | End: 2025-07-31 | Stop reason: HOSPADM

## 2025-07-31 RX ORDER — TETRACAINE HYDROCHLORIDE 5 MG/ML
1-2 SOLUTION OPHTHALMIC ONCE
Status: DISCONTINUED | OUTPATIENT
Start: 2025-07-31 | End: 2025-07-31 | Stop reason: HOSPADM

## 2025-07-31 RX ORDER — DEXAMETHASONE SODIUM PHOSPHATE 10 MG/ML
10 INJECTION, SOLUTION INTRAMUSCULAR; INTRAVENOUS ONCE
Status: COMPLETED | OUTPATIENT
Start: 2025-07-31 | End: 2025-07-31

## 2025-07-31 RX ADMIN — DIPHENHYDRAMINE HYDROCHLORIDE 25 MG: 50 INJECTION, SOLUTION INTRAMUSCULAR; INTRAVENOUS at 08:25

## 2025-07-31 RX ADMIN — DEXAMETHASONE SODIUM PHOSPHATE 10 MG: 10 INJECTION, SOLUTION INTRAMUSCULAR; INTRAVENOUS at 08:24

## 2025-07-31 RX ADMIN — SODIUM CHLORIDE 1000 ML: 0.9 INJECTION, SOLUTION INTRAVENOUS at 08:18

## 2025-07-31 RX ADMIN — PROCHLORPERAZINE EDISYLATE 10 MG: 5 INJECTION INTRAMUSCULAR; INTRAVENOUS at 08:23

## 2025-07-31 ASSESSMENT — ACTIVITIES OF DAILY LIVING (ADL)
ADLS_ACUITY_SCORE: 41

## 2025-07-31 ASSESSMENT — COLUMBIA-SUICIDE SEVERITY RATING SCALE - C-SSRS
6. HAVE YOU EVER DONE ANYTHING, STARTED TO DO ANYTHING, OR PREPARED TO DO ANYTHING TO END YOUR LIFE?: NO
2. HAVE YOU ACTUALLY HAD ANY THOUGHTS OF KILLING YOURSELF IN THE PAST MONTH?: NO
2. HAVE YOU ACTUALLY HAD ANY THOUGHTS OF KILLING YOURSELF IN THE PAST MONTH?: NO
1. IN THE PAST MONTH, HAVE YOU WISHED YOU WERE DEAD OR WISHED YOU COULD GO TO SLEEP AND NOT WAKE UP?: NO
1. IN THE PAST MONTH, HAVE YOU WISHED YOU WERE DEAD OR WISHED YOU COULD GO TO SLEEP AND NOT WAKE UP?: NO
6. HAVE YOU EVER DONE ANYTHING, STARTED TO DO ANYTHING, OR PREPARED TO DO ANYTHING TO END YOUR LIFE?: NO

## 2025-07-31 ASSESSMENT — VISUAL ACUITY
OD: 20/20
OS: 20/20

## 2025-07-31 NOTE — ED NOTES
"Pt reports headache is now 4/10, feels \"a little better\" and less shaky after the medications. VSS. Given cup of water.  "

## 2025-07-31 NOTE — ED PROVIDER NOTES
Emergency Department Note      History of Present Illness     Chief Complaint   Headache      HPI   Thiago Mayorga is a 29 year old female with a history of migraines presenting to the ED for evaluation of a headache. The patient reports that she has had an intermittent headache behind her eyes for the past five days, but in the last two days, it has localized to her left eye. Her last migraine was last week and she states that this feels worse than her typical migraines. She has not taken anything for the pain. No recent falls or head trauma. No vision changes, neck pain, nausea, or vomiting. She does not wear contacts, and she stopped her birth control 3 months ago.     Independent Historian   None    Review of External Notes   None    Past Medical History     Medical History and Problem List   Iron deficiency anemia   Menorrhagia  Migraine     Medications   The patient is currently on no regular medications.     Physical Exam     Patient Vitals for the past 24 hrs:   BP Temp Temp src Pulse Resp SpO2   07/31/25 0900 113/77 -- -- 81 -- 100 %   07/31/25 0840 -- -- -- -- -- 100 %   07/31/25 0830 113/82 -- -- 75 -- 100 %   07/31/25 0806 118/84 -- -- -- 16 100 %   07/31/25 0747 121/83 97.4  F (36.3  C) Temporal 75 16 98 %     Physical Exam  Constitutional: Alert, attentive, GCS 15  Neck: ranging neck freely  Eyes: EOM are normal, anicteric, conjugate gaze; No RAPD. Visual acuity 20/20 bilaterally.   CV: regular rate and rhythm   Chest: Effort normal and breath sounds clear without wheezing or rales, symmetric bilaterally   GI:  non tender. No distension. No guarding or rebound.    MSK: No LE edema, no tenderness to palpation of BLE.  Neurological: Alert, attentive, moving all extremities equally.   Skin: Skin is warm and dry.     Diagnostics     Lab Results   Labs Ordered and Resulted from Time of ED Arrival to Time of ED Departure   TSH WITH FREE T4 REFLEX - Abnormal       Result Value    TSH 5.62 (*)     HCG QUALITATIVE PREGNANCY - Normal    hCG Serum Qualitative Negative     D DIMER QUANTITATIVE - Normal    D-Dimer Quantitative 0.46     T4 FREE       Imaging   No orders to display     Independent Interpretation   None    ED Course      Medications Administered   Medications   tetracaine (PONTOCAINE) 0.5 % ophthalmic solution 1-2 drop (has no administration in time range)   fluorescein (FUL-JESUSITA) ophthalmic strip 1 strip (has no administration in time range)   sodium chloride 0.9% BOLUS 1,000 mL (0 mLs Intravenous Stopped 7/31/25 0836)   dexAMETHasone PF (DECADRON) injection 10 mg (10 mg Intravenous $Given 7/31/25 0824)   prochlorperazine (COMPAZINE) injection 10 mg (10 mg Intravenous $Given 7/31/25 0823)   diphenhydrAMINE (BENADRYL) injection 25 mg (25 mg Intravenous $Given 7/31/25 0825)     Procedures   Procedures     Discussion of Management   None    ED Course   ED Course as of 07/31/25 0930   Thu Jul 31, 2025   0800 I obtained history and examined the patient as noted above.        Additional Documentation  None    Medical Decision Making / Diagnosis     CMS Diagnoses: None    MIPS   None               MDM   Firehiwot Jair Mayorga is a 29 year old female past medical history significant for migraines presenting for previously bilateral not unilateral left-sided waxing and waning headache associated with some left eye pain and blurry vision.  Visual acuity on arrival here is 20/20 bilaterally, her eye is clear, quiet with low suspicion for corneal trauma, ulcer or retinal detachment.  She reports this feels like her usual migraine times, I did elect proceed with a D-dimer given albeit low concern for venous sinus thrombosis but symptom constellation could certainly be consistent with that she is no longer on her estrogen-containing birth control, and her D-dimer is negative which is approximately 5% sensitive at ruling out thrombosis.  With the above medication, her headache was significantly improved with  higher suspicion for migrainous etiology.  No indication for neuroimaging.  I stressed the importance of follow-up with eye clinic as well as primary care clinic.  Return precautions reviewed and she was discharged.    Disposition   The patient was discharged.     Diagnosis     ICD-10-CM    1. Acute nonintractable headache, unspecified headache type  R51.9       2. History of migraine  Z86.69          Krunal Nelson MD  [employer]  9:30 AM 07/31/25       Scribe Disclosure:  I, Effie Cortes, am serving as a scribe at 8:00 AM on 7/31/2025 to document services personally performed by Krunal Nelson MD based on my observations and the provider's statements to me.        Krunal Nelson MD  07/31/25 1607

## 2025-07-31 NOTE — ED NOTES
Pt reports feeling shaky and unwell after migraine medications, especially after IV Decadron. Patient asks that IV fluids be stopped. Respirations even and unlabored, pt given a blanket, call light in reach, she is talking on her phone.

## 2025-07-31 NOTE — ED TRIAGE NOTES
Left sided frontal headache that started Sunday, followed by left eye discomfort and pain that also started Sunday. Patient states the pain has worsened.

## 2025-07-31 NOTE — DISCHARGE INSTRUCTIONS
You should return the emergency department should you develop worsening symptoms, especially weakness or numbness on one side or loss of vision, I would make appointment follow-up in eye clinic for recheck as well as with your primary doctor.    Discharge Instructions  Migraine    You were seen today for a headache that your provider thinks is likely a migraine. At this time your provider does not find that your headache is a sign of anything dangerous or life-threatening.  However, sometimes the signs of serious illness do not show up right away.      Generally, every Emergency Department visit should have a follow-up clinic visit with either a primary or a specialty clinic/provider. Please follow-up as instructed by your emergency provider today.    Return to the Emergency Department if:  You get a fever of 100.4 F or higher.  You get a stiff neck with your headache.  You get a new headache that is different or worse than headaches you have had before.  You are vomiting (throwing up) and cannot keep food or water down.  You have blurry or double vision or other problems with your eyes.  You have a new weakness on one side of your body.  You have difficulty with balance which is new.  You or your family thinks you are confused.  You have a seizure.    Treatment:  Often, treatment for your migraine will take some time to make you headache stop.  Going home to sleep can be very effective.  Use your medications as directed; overuse of medications can actually cause headaches.  Once your headache has gone away, avoid triggers such as certain foods, skipping meals, bright lights, changes in sleep, exercise and stress.  Migraine headaches can have symptoms before the pain starts, like vision changes, funny smells/tastes, dizziness or other symptoms.  Treating a headache as soon as the first symptoms come on is very important and gives the best chance of stopping the headache.  If headaches are severe or frequent, you may  need to start daily medication to prevent the headaches.  Carbon monoxide can cause headaches, so not burning things in your home is important.  Also get a carbon monoxide detector.  Some medications for migraines may raise your blood pressure, so use with caution if you have high blood pressure or heart problems.  If you were given a prescription for medicine here today, be sure to read all of the information (including the package insert) that comes with your prescription.  This will include important information about the medicine, its side effects, and any warnings that you need to know about.  The pharmacist who fills the prescription can provide more information and answer questions you may have about the medicine.  If you have questions or concerns that the pharmacist cannot address, please call or return to the Emergency Department.   Remember that you can always come back to the Emergency Department if you are not able to see your regular provider in the amount of time listed above, if you get any new symptoms, or if there is anything that worries you.

## 2025-08-03 ENCOUNTER — APPOINTMENT (OUTPATIENT)
Dept: CT IMAGING | Facility: CLINIC | Age: 30
End: 2025-08-03
Attending: SOCIAL WORKER
Payer: COMMERCIAL

## 2025-08-03 ENCOUNTER — HOSPITAL ENCOUNTER (EMERGENCY)
Facility: CLINIC | Age: 30
Discharge: HOME | End: 2025-08-03
Attending: SOCIAL WORKER | Admitting: SOCIAL WORKER
Payer: COMMERCIAL

## 2025-08-03 VITALS
OXYGEN SATURATION: 100 % | HEART RATE: 77 BPM | SYSTOLIC BLOOD PRESSURE: 101 MMHG | RESPIRATION RATE: 16 BRPM | TEMPERATURE: 97.6 F | DIASTOLIC BLOOD PRESSURE: 67 MMHG

## 2025-08-03 DIAGNOSIS — R51.9 HEADACHE, UNSPECIFIED HEADACHE TYPE: Primary | ICD-10-CM

## 2025-08-03 DIAGNOSIS — H57.12 LEFT EYE PAIN: ICD-10-CM

## 2025-08-03 LAB
ANION GAP SERPL CALCULATED.3IONS-SCNC: 11 MMOL/L (ref 7–15)
BASOPHILS # BLD AUTO: 0 10E3/UL (ref 0–0.2)
BASOPHILS NFR BLD AUTO: 1 %
BUN SERPL-MCNC: 9.9 MG/DL (ref 6–20)
CALCIUM SERPL-MCNC: 8.8 MG/DL (ref 8.8–10.4)
CHLORIDE SERPL-SCNC: 105 MMOL/L (ref 98–107)
CREAT SERPL-MCNC: 0.58 MG/DL (ref 0.51–0.95)
EGFRCR SERPLBLD CKD-EPI 2021: >90 ML/MIN/1.73M2
EOSINOPHIL # BLD AUTO: 0.1 10E3/UL (ref 0–0.7)
EOSINOPHIL NFR BLD AUTO: 1 %
ERYTHROCYTE [DISTWIDTH] IN BLOOD BY AUTOMATED COUNT: 14.3 % (ref 10–15)
GLUCOSE SERPL-MCNC: 113 MG/DL (ref 70–99)
HCO3 SERPL-SCNC: 25 MMOL/L (ref 22–29)
HCT VFR BLD AUTO: 35.1 % (ref 35–47)
HGB BLD-MCNC: 11.3 G/DL (ref 11.7–15.7)
IMM GRANULOCYTES # BLD: 0 10E3/UL
IMM GRANULOCYTES NFR BLD: 0 %
LYMPHOCYTES # BLD AUTO: 2.3 10E3/UL (ref 0.8–5.3)
LYMPHOCYTES NFR BLD AUTO: 39 %
MCH RBC QN AUTO: 25.7 PG (ref 26.5–33)
MCHC RBC AUTO-ENTMCNC: 32.2 G/DL (ref 31.5–36.5)
MCV RBC AUTO: 80 FL (ref 78–100)
MONOCYTES # BLD AUTO: 0.4 10E3/UL (ref 0–1.3)
MONOCYTES NFR BLD AUTO: 6 %
NEUTROPHILS # BLD AUTO: 3.1 10E3/UL (ref 1.6–8.3)
NEUTROPHILS NFR BLD AUTO: 52 %
NRBC # BLD AUTO: 0 10E3/UL
NRBC BLD AUTO-RTO: 0 /100
PLATELET # BLD AUTO: 261 10E3/UL (ref 150–450)
POTASSIUM SERPL-SCNC: 4 MMOL/L (ref 3.4–5.3)
RBC # BLD AUTO: 4.39 10E6/UL (ref 3.8–5.2)
SODIUM SERPL-SCNC: 141 MMOL/L (ref 135–145)
WBC # BLD AUTO: 5.9 10E3/UL (ref 4–11)

## 2025-08-03 PROCEDURE — 250N000011 HC RX IP 250 OP 636: Performed by: SOCIAL WORKER

## 2025-08-03 PROCEDURE — 36415 COLL VENOUS BLD VENIPUNCTURE: CPT | Performed by: SOCIAL WORKER

## 2025-08-03 PROCEDURE — 70450 CT HEAD/BRAIN W/O DYE: CPT | Mod: XU

## 2025-08-03 PROCEDURE — 96374 THER/PROPH/DIAG INJ IV PUSH: CPT | Mod: 59 | Performed by: SOCIAL WORKER

## 2025-08-03 PROCEDURE — 85025 COMPLETE CBC W/AUTO DIFF WBC: CPT | Performed by: SOCIAL WORKER

## 2025-08-03 PROCEDURE — 99285 EMERGENCY DEPT VISIT HI MDM: CPT | Mod: 25 | Performed by: SOCIAL WORKER

## 2025-08-03 PROCEDURE — 250N000009 HC RX 250: Performed by: SOCIAL WORKER

## 2025-08-03 PROCEDURE — 70498 CT ANGIOGRAPHY NECK: CPT

## 2025-08-03 PROCEDURE — 250N000013 HC RX MED GY IP 250 OP 250 PS 637: Performed by: SOCIAL WORKER

## 2025-08-03 PROCEDURE — 80048 BASIC METABOLIC PNL TOTAL CA: CPT | Performed by: SOCIAL WORKER

## 2025-08-03 RX ORDER — METOCLOPRAMIDE HYDROCHLORIDE 5 MG/ML
5 INJECTION INTRAMUSCULAR; INTRAVENOUS ONCE
Status: COMPLETED | OUTPATIENT
Start: 2025-08-03 | End: 2025-08-03

## 2025-08-03 RX ORDER — IOPAMIDOL 755 MG/ML
67 INJECTION, SOLUTION INTRAVASCULAR ONCE
Status: COMPLETED | OUTPATIENT
Start: 2025-08-03 | End: 2025-08-03

## 2025-08-03 RX ORDER — METOCLOPRAMIDE 5 MG/1
5 TABLET ORAL 3 TIMES DAILY PRN
Qty: 9 TABLET | Refills: 0 | Status: SHIPPED | OUTPATIENT
Start: 2025-08-03 | End: 2025-08-06

## 2025-08-03 RX ORDER — ACETAMINOPHEN 500 MG
1000 TABLET ORAL ONCE
Status: COMPLETED | OUTPATIENT
Start: 2025-08-03 | End: 2025-08-03

## 2025-08-03 RX ADMIN — METOCLOPRAMIDE 5 MG: 5 INJECTION, SOLUTION INTRAMUSCULAR; INTRAVENOUS at 12:55

## 2025-08-03 RX ADMIN — SODIUM CHLORIDE 100 ML: 9 INJECTION, SOLUTION INTRAVENOUS at 14:13

## 2025-08-03 RX ADMIN — ACETAMINOPHEN 1000 MG: 500 TABLET, FILM COATED ORAL at 12:53

## 2025-08-03 RX ADMIN — IOPAMIDOL 67 ML: 755 INJECTION, SOLUTION INTRAVENOUS at 14:13

## 2025-08-03 ASSESSMENT — ACTIVITIES OF DAILY LIVING (ADL)
ADLS_ACUITY_SCORE: 41

## 2025-08-03 ASSESSMENT — VISUAL ACUITY
OS: 20/25
OD: 20/25

## 2025-08-12 ENCOUNTER — PRE VISIT (OUTPATIENT)
Dept: NEUROLOGY | Facility: CLINIC | Age: 30
End: 2025-08-12

## 2025-08-12 ENCOUNTER — OFFICE VISIT (OUTPATIENT)
Dept: NEUROLOGY | Facility: CLINIC | Age: 30
End: 2025-08-12
Attending: SOCIAL WORKER
Payer: COMMERCIAL

## 2025-08-12 VITALS — OXYGEN SATURATION: 100 % | SYSTOLIC BLOOD PRESSURE: 99 MMHG | DIASTOLIC BLOOD PRESSURE: 67 MMHG | HEART RATE: 83 BPM

## 2025-08-12 DIAGNOSIS — H57.12 LEFT EYE PAIN: ICD-10-CM

## 2025-08-12 DIAGNOSIS — G43.109 MIGRAINE WITH AURA AND WITHOUT STATUS MIGRAINOSUS, NOT INTRACTABLE: Primary | ICD-10-CM

## 2025-08-12 PROCEDURE — 3078F DIAST BP <80 MM HG: CPT

## 2025-08-12 PROCEDURE — 1125F AMNT PAIN NOTED PAIN PRSNT: CPT

## 2025-08-12 PROCEDURE — 3074F SYST BP LT 130 MM HG: CPT

## 2025-08-12 PROCEDURE — G2211 COMPLEX E/M VISIT ADD ON: HCPCS

## 2025-08-12 PROCEDURE — 99204 OFFICE O/P NEW MOD 45 MIN: CPT

## 2025-08-12 RX ORDER — SUMATRIPTAN 50 MG/1
50 TABLET, FILM COATED ORAL
Qty: 18 TABLET | Refills: 3 | Status: SHIPPED | OUTPATIENT
Start: 2025-08-12

## 2025-08-12 ASSESSMENT — HEADACHE IMPACT TEST (HIT 6)
HOW OFTEN HAVE YOU FELT TOO TIRED TO WORK BECAUSE OF YOUR HEADACHES: ALWAYS
HIT6 TOTAL SCORE: 72
WHEN YOU HAVE HEADACHES HOW OFTEN IS THE PAIN SEVERE: ALWAYS
WHEN YOU HAVE A HEADACHE HOW OFTEN DO YOU WISH YOU COULD LIE DOWN: VERY OFTEN
HOW OFTEN DO HEADACHES LIMIT YOUR DAILY ACTIVITIES: ALWAYS
HOW OFTEN DID HEADACHS LIMIT CONCENTRATION ON WORK OR DAILY ACTIVITY: VERY OFTEN
HOW OFTEN HAVE YOU FELT FED UP OR IRRITATED BECAUSE OF YOUR HEADACHES: VERY OFTEN

## 2025-08-12 ASSESSMENT — PAIN SCALES - GENERAL: PAINLEVEL_OUTOF10: MODERATE PAIN (5)

## 2025-08-18 ENCOUNTER — OFFICE VISIT (OUTPATIENT)
Dept: OPHTHALMOLOGY | Facility: CLINIC | Age: 30
End: 2025-08-18
Attending: OPHTHALMOLOGY
Payer: COMMERCIAL

## 2025-08-18 DIAGNOSIS — H20.9 ANTERIOR UVEITIS: Primary | ICD-10-CM

## 2025-08-18 DIAGNOSIS — R76.12 POSITIVE QUANTIFERON-TB GOLD TEST: ICD-10-CM

## 2025-08-18 DIAGNOSIS — H30.23 INTERMEDIATE UVEITIS OF BOTH EYES: ICD-10-CM

## 2025-08-18 PROCEDURE — 99213 OFFICE O/P EST LOW 20 MIN: CPT

## 2025-08-18 PROCEDURE — 99214 OFFICE O/P EST MOD 30 MIN: CPT | Mod: GC | Performed by: OPHTHALMOLOGY

## 2025-08-18 ASSESSMENT — CUP TO DISC RATIO
OS_RATIO: 0.25
OD_RATIO: 0.25

## 2025-08-18 ASSESSMENT — SLIT LAMP EXAM - LIDS
COMMENTS: NORMAL
COMMENTS: NORMAL

## 2025-08-18 ASSESSMENT — VISUAL ACUITY
METHOD: SNELLEN - LINEAR
OD_CC: 20/25
OS_CC+: -1
OS_CC: 20/25
CORRECTION_TYPE: GLASSES

## 2025-08-18 ASSESSMENT — TONOMETRY
OS_IOP_MMHG: 18
IOP_METHOD: ICARE
OD_IOP_MMHG: 18

## 2025-08-18 ASSESSMENT — EXTERNAL EXAM - LEFT EYE: OS_EXAM: NORMAL

## 2025-08-18 ASSESSMENT — EXTERNAL EXAM - RIGHT EYE: OD_EXAM: NORMAL

## 2025-08-21 ENCOUNTER — TELEPHONE (OUTPATIENT)
Dept: OPHTHALMOLOGY | Facility: CLINIC | Age: 30
End: 2025-08-21
Payer: COMMERCIAL

## 2025-08-21 DIAGNOSIS — R76.12 POSITIVE QUANTIFERON-TB GOLD TEST: Primary | ICD-10-CM

## 2025-08-21 DIAGNOSIS — H30.23 INTERMEDIATE UVEITIS OF BOTH EYES: ICD-10-CM

## 2025-08-21 DIAGNOSIS — H20.9 ANTERIOR UVEITIS: ICD-10-CM

## 2025-08-25 ENCOUNTER — OFFICE VISIT (OUTPATIENT)
Dept: OPHTHALMOLOGY | Facility: CLINIC | Age: 30
End: 2025-08-25
Attending: OPHTHALMOLOGY
Payer: COMMERCIAL

## 2025-08-25 ENCOUNTER — TELEPHONE (OUTPATIENT)
Dept: INFECTIOUS DISEASES | Facility: CLINIC | Age: 30
End: 2025-08-25

## 2025-08-25 DIAGNOSIS — H20.9 UVEITIS OF BOTH EYES: Primary | ICD-10-CM

## 2025-08-25 PROCEDURE — 99213 OFFICE O/P EST LOW 20 MIN: CPT

## 2025-08-25 PROCEDURE — 99213 OFFICE O/P EST LOW 20 MIN: CPT | Mod: GC | Performed by: OPHTHALMOLOGY

## 2025-08-25 RX ORDER — DIFLUPREDNATE OPHTHALMIC 0.5 MG/ML
1 EMULSION OPHTHALMIC 4 TIMES DAILY
Qty: 5 ML | Refills: 2 | Status: SHIPPED | OUTPATIENT
Start: 2025-08-25

## 2025-08-25 ASSESSMENT — REFRACTION_MANIFEST
OD_CYLINDER: SPHERE
METHOD_AUTOREFRACTION: 1
OS_AXIS: 152
OS_AXIS: 152
OS_CYLINDER: +0.25
OS_CYLINDER: +0.25
OD_SPHERE: +1.50
OD_CYLINDER: SPHERE
OD_SPHERE: +2.75
OS_SPHERE: +2.50
OS_SPHERE: +2.50

## 2025-08-25 ASSESSMENT — VISUAL ACUITY
METHOD: SNELLEN - LINEAR
OD_SC: 20/100
OD_PH_SC+: +2
OS_PH_SC: 20/30
OD_PH_SC: 20/30
OS_PH_SC+: +2
OS_SC: 20/100

## 2025-08-25 ASSESSMENT — REFRACTION_WEARINGRX
OD_CYLINDER: SPHERE
OS_SPHERE: +3.25
OS_CYLINDER: SPHERE
OD_SPHERE: +3.25
SPECS_TYPE: OTC

## 2025-08-25 ASSESSMENT — CUP TO DISC RATIO
OS_RATIO: 0.25
OD_RATIO: 0.25

## 2025-08-25 ASSESSMENT — SLIT LAMP EXAM - LIDS
COMMENTS: NORMAL
COMMENTS: NORMAL

## 2025-08-25 ASSESSMENT — TONOMETRY
OS_IOP_MMHG: 21
OD_IOP_MMHG: 19
IOP_METHOD: TONOPEN

## 2025-08-25 ASSESSMENT — EXTERNAL EXAM - LEFT EYE: OS_EXAM: NORMAL

## 2025-08-25 ASSESSMENT — EXTERNAL EXAM - RIGHT EYE: OD_EXAM: NORMAL

## 2025-08-26 ENCOUNTER — ANCILLARY PROCEDURE (OUTPATIENT)
Dept: GENERAL RADIOLOGY | Facility: CLINIC | Age: 30
End: 2025-08-26
Payer: COMMERCIAL

## 2025-08-26 ENCOUNTER — TELEPHONE (OUTPATIENT)
Dept: INFECTIOUS DISEASES | Facility: CLINIC | Age: 30
End: 2025-08-26

## 2025-08-26 DIAGNOSIS — R76.12 POSITIVE QUANTIFERON-TB GOLD TEST: ICD-10-CM

## 2025-08-26 DIAGNOSIS — H30.23 INTERMEDIATE UVEITIS OF BOTH EYES: ICD-10-CM

## 2025-08-26 DIAGNOSIS — H20.9 ANTERIOR UVEITIS: ICD-10-CM

## 2025-08-26 DIAGNOSIS — H20.00 ACUTE IRITIS OF BOTH EYES: ICD-10-CM

## 2025-08-26 PROCEDURE — 71046 X-RAY EXAM CHEST 2 VIEWS: CPT | Mod: TC | Performed by: INTERNAL MEDICINE

## 2025-09-02 ENCOUNTER — OFFICE VISIT (OUTPATIENT)
Dept: OPHTHALMOLOGY | Facility: CLINIC | Age: 30
End: 2025-09-02
Payer: COMMERCIAL

## 2025-09-02 DIAGNOSIS — H20.9 UVEITIS OF BOTH EYES: Primary | ICD-10-CM

## 2025-09-02 PROCEDURE — 99213 OFFICE O/P EST LOW 20 MIN: CPT | Performed by: OPHTHALMOLOGY

## 2025-09-02 ASSESSMENT — TONOMETRY
OD_IOP_MMHG: 20
IOP_METHOD: TONOPEN
OS_IOP_MMHG: 22

## 2025-09-02 ASSESSMENT — VISUAL ACUITY
OS_CC: 20/25
OD_PH_CC: 20/20
METHOD: SNELLEN - LINEAR
OD_CC: 20/40
CORRECTION_TYPE: GLASSES
OS_PH_CC: 20/20
OS_CC+: -2

## 2025-09-02 ASSESSMENT — REFRACTION_WEARINGRX
OD_SPHERE: +3.25
OS_SPHERE: +3.25
OD_CYLINDER: SPHERE
OS_CYLINDER: SPHERE
SPECS_TYPE: OTC

## 2025-09-02 ASSESSMENT — CONF VISUAL FIELD
OD_NORMAL: 1
OS_SUPERIOR_TEMPORAL_RESTRICTION: 0
OD_SUPERIOR_NASAL_RESTRICTION: 0
OS_SUPERIOR_NASAL_RESTRICTION: 0
METHOD: COUNTING FINGERS
OD_INFERIOR_TEMPORAL_RESTRICTION: 0
OS_INFERIOR_NASAL_RESTRICTION: 0
OD_INFERIOR_NASAL_RESTRICTION: 0
OS_NORMAL: 1
OD_SUPERIOR_TEMPORAL_RESTRICTION: 0
OS_INFERIOR_TEMPORAL_RESTRICTION: 0

## 2025-09-02 ASSESSMENT — SLIT LAMP EXAM - LIDS
COMMENTS: NORMAL
COMMENTS: NORMAL

## 2025-09-02 ASSESSMENT — EXTERNAL EXAM - RIGHT EYE: OD_EXAM: NORMAL

## 2025-09-02 ASSESSMENT — CUP TO DISC RATIO
OD_RATIO: 0.25
OS_RATIO: 0.25

## 2025-09-02 ASSESSMENT — EXTERNAL EXAM - LEFT EYE: OS_EXAM: NORMAL
